# Patient Record
Sex: MALE | Race: WHITE | NOT HISPANIC OR LATINO | Employment: FULL TIME | URBAN - METROPOLITAN AREA
[De-identification: names, ages, dates, MRNs, and addresses within clinical notes are randomized per-mention and may not be internally consistent; named-entity substitution may affect disease eponyms.]

---

## 2017-07-26 ENCOUNTER — TELEPHONE (OUTPATIENT)
Dept: NEUROLOGY | Facility: CLINIC | Age: 32
End: 2017-07-26

## 2017-07-26 NOTE — TELEPHONE ENCOUNTER
Spoke with patient. Appointment scheduled with Dr. Matta. Pt verbalized an understanding and directions given.

## 2017-07-26 NOTE — TELEPHONE ENCOUNTER
----- Message from Erna Tripathi sent at 7/26/2017  2:22 PM CDT -----  Contact:  call/[phone]    Calling  To  Get  Fitted in   Schedule not  Available   //  Pt  Has  reff  And is  Suffering   With  Headaches // please call

## 2017-08-11 DIAGNOSIS — M25.512 LEFT SHOULDER PAIN, UNSPECIFIED CHRONICITY: Primary | ICD-10-CM

## 2017-08-15 ENCOUNTER — HOSPITAL ENCOUNTER (OUTPATIENT)
Dept: RADIOLOGY | Facility: HOSPITAL | Age: 32
Discharge: HOME | End: 2017-08-15
Attending: ORTHOPAEDIC SURGERY
Payer: COMMERCIAL

## 2017-08-15 ENCOUNTER — OFFICE VISIT (OUTPATIENT)
Dept: ORTHOPEDICS | Facility: CLINIC | Age: 32
End: 2017-08-15
Payer: COMMERCIAL

## 2017-08-15 VITALS
DIASTOLIC BLOOD PRESSURE: 91 MMHG | HEART RATE: 81 BPM | SYSTOLIC BLOOD PRESSURE: 139 MMHG | HEIGHT: 70 IN | BODY MASS INDEX: 26.05 KG/M2 | WEIGHT: 182 LBS

## 2017-08-15 DIAGNOSIS — M25.512 LEFT SHOULDER PAIN, UNSPECIFIED CHRONICITY: ICD-10-CM

## 2017-08-15 DIAGNOSIS — M75.22 BICEPS TENDONITIS, LEFT: Primary | ICD-10-CM

## 2017-08-15 DIAGNOSIS — M25.512 ACUTE PAIN OF LEFT SHOULDER: ICD-10-CM

## 2017-08-15 DIAGNOSIS — M25.612 STIFFNESS OF JOINT, SHOULDER REGION, LEFT: ICD-10-CM

## 2017-08-15 DIAGNOSIS — S43.432A SUPERIOR GLENOID LABRUM LESION OF LEFT SHOULDER, INITIAL ENCOUNTER: ICD-10-CM

## 2017-08-15 PROCEDURE — 73030 X-RAY EXAM OF SHOULDER: CPT | Mod: TC,PO,LT

## 2017-08-15 PROCEDURE — 3008F BODY MASS INDEX DOCD: CPT | Mod: S$GLB,,, | Performed by: ORTHOPAEDIC SURGERY

## 2017-08-15 PROCEDURE — 99999 PR PBB SHADOW E&M-EST. PATIENT-LVL III: CPT | Mod: PBBFAC,,, | Performed by: ORTHOPAEDIC SURGERY

## 2017-08-15 PROCEDURE — 99203 OFFICE O/P NEW LOW 30 MIN: CPT | Mod: S$GLB,,, | Performed by: ORTHOPAEDIC SURGERY

## 2017-08-15 PROCEDURE — 3075F SYST BP GE 130 - 139MM HG: CPT | Mod: S$GLB,,, | Performed by: ORTHOPAEDIC SURGERY

## 2017-08-15 PROCEDURE — 3080F DIAST BP >= 90 MM HG: CPT | Mod: S$GLB,,, | Performed by: ORTHOPAEDIC SURGERY

## 2017-08-15 PROCEDURE — 73030 X-RAY EXAM OF SHOULDER: CPT | Mod: 26,LT,, | Performed by: RADIOLOGY

## 2017-08-15 NOTE — PROGRESS NOTES
"Medical/DANNIELLE Student  Unsigned   Encounter Date: 8/15/2017  Rosa Armenta        Subjective:       Patient ID: Frank Becerra is a 31 y.o. male.     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 Localizes pain to "under left scapula" and reports it feels "like there is a marble under the scapula"   Seen by Dr Gill in 2014 and had MRI at that time. Has done 6w of PT, received 2-3 cortisone injections, Rx for ointments, TENS, NSAIDS and heat/cold on shoulder without relief. Reports the pain is relatively constant at ~5/10. Denies radiation. Describes pain as a pressure feeling, worse with increased use. Pain occasionally wakes him at night. Denies numbness, tingling, fever, chills. Reports weakness/general fatigue when using left arm above head. Reports some general muscle soreness in neck.      Review of Systems   Constitutional: Negative for chills, fever and unexpected weight change.   HENT: Negative.    Eyes: Negative.    Respiratory: Negative.    Cardiovascular: Negative.    Gastrointestinal: Negative.    Endocrine: Negative.    Genitourinary: Negative.    Musculoskeletal: Positive for arthralgias, myalgias and neck stiffness.   Skin: Negative.    Neurological: Positive for weakness. Negative for numbness.       Objective:      Physical Exam   Constitutional: He is oriented to person, place, and time. He appears well-developed and well-nourished.   HENT:   Head: Normocephalic and atraumatic.   Eyes: EOM are normal. Pupils are equal, round, and reactive to light.   Neck: Normal range of motion. Neck supple.   Cardiovascular: Normal rate and regular rhythm.    Pulmonary/Chest: Effort normal.   Abdominal: Soft.   Musculoskeletal: He exhibits tenderness.        Left shoulder: He exhibits decreased range of motion, tenderness and pain.   ~3-4cm scar noted to lateral aspect of left shoulder - " patient unsure of cause. Pain with palpation of the Left biceps tendon. Pain with palpation to trapezius along medial aspect of left scapula. Abnormal Apley Scratch test with decreased abduction, adduction, internal and external rotation of left shoulder. Pain in left shoulder with resistance during west-miguel test.     Neurological: He is alert and oriented to person, place, and time.   Skin: Skin is warm and dry.   Psychiatric: He has a normal mood and affect. Judgment normal.       Assessment:       1. Biceps tendonitis, left    2. Acute pain of left shoulder    3. Stiffness of joint, shoulder region, left    4. Superior glenoid labrum lesion of left shoulder, initial encounter        Plan:       # Biceps tendonitis, left    - Pt counseled on importance of increasing ROM and strength    - Ambulatory PT referral     - OTC NSAIDs for inflammation and pain     # Superior glenoid labrum lesion of left shoulder    - schedule MRI with arthrogram

## 2017-08-15 NOTE — PROGRESS NOTES
Subjective:          Chief Complaint: Frank Becerra is a 31 y.o. male who had concerns including Pain of the Left Shoulder.    HPI    ROS                Objective:        General: Frank is well-developed, well-nourished, appears stated age, in no acute distress, alert and oriented to time, place and person.     Ortho/SPM Exam            Assessment:       Encounter Diagnosis   Name Primary?    Acute pain of left shoulder Yes          Plan:

## 2017-08-15 NOTE — MEDICAL/APP STUDENT
"Subjective:       Patient ID: Frank Becerra is a 31 y.o. male.    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 Localizes pain to "under left scapula" and reports it feels "like there is a marble under the scapula"   Seen by Dr Gill in 2014 and had MRI at that time. Has done 6w of PT, received 2-3 cortisone injections, Rx for ointments, TENS, NSAIDS and heat/cold on shoulder without relief. Reports the pain is relatively constant at ~5/10. Denies radiation. Describes pain as a pressure feeling, worse with increased use. Pain occasionally wakes him at night. Denies numbness, tingling, fever, chills. Reports weakness/general fatigue when using left arm above head. Reports some general muscle soreness in neck.       Review of Systems   Constitutional: Negative for chills, fever and unexpected weight change.   HENT: Negative.    Eyes: Negative.    Respiratory: Negative.    Cardiovascular: Negative.    Gastrointestinal: Negative.    Endocrine: Negative.    Genitourinary: Negative.    Musculoskeletal: Positive for arthralgias, myalgias and neck stiffness.   Skin: Negative.    Neurological: Positive for weakness. Negative for numbness.       Objective:      Physical Exam   Constitutional: He is oriented to person, place, and time. He appears well-developed and well-nourished.   HENT:   Head: Normocephalic and atraumatic.   Eyes: EOM are normal. Pupils are equal, round, and reactive to light.   Neck: Normal range of motion. Neck supple.   Cardiovascular: Normal rate and regular rhythm.    Pulmonary/Chest: Effort normal.   Abdominal: Soft.   Musculoskeletal: He exhibits tenderness.        Left shoulder: He exhibits decreased range of motion, tenderness and pain.   ~3-4cm scar noted to lateral aspect of left shoulder - patient unsure of cause. Pain with palpation of the Left biceps tendon. Pain with " palpation to trapezius along medial aspect of left scapula. Abnormal Apley Scratch test with decreased abduction, adduction, internal and external rotation of left shoulder. Pain in left shoulder with resistance during west-miguel test.     Neurological: He is alert and oriented to person, place, and time.   Skin: Skin is warm and dry.   Psychiatric: He has a normal mood and affect. Judgment normal.       Assessment:       1. Biceps tendonitis, left    2. Acute pain of left shoulder    3. Stiffness of joint, shoulder region, left    4. Superior glenoid labrum lesion of left shoulder, initial encounter        Plan:       # Biceps tendonitis, left    - Pt counseled on importance of increasing ROM and strength    - Ambulatory PT referral     - OTC NSAIDs for inflammation and pain    # Superior glenoid labrum lesion of left shoulder    - schedule MRI with arthrogram      Patient discussed with and seen by Dr Michael Armenta, MS4

## 2017-08-16 ENCOUNTER — OFFICE VISIT (OUTPATIENT)
Dept: NEUROLOGY | Facility: CLINIC | Age: 32
End: 2017-08-16
Payer: COMMERCIAL

## 2017-08-16 VITALS
RESPIRATION RATE: 19 BRPM | BODY MASS INDEX: 26.05 KG/M2 | SYSTOLIC BLOOD PRESSURE: 136 MMHG | HEIGHT: 70 IN | DIASTOLIC BLOOD PRESSURE: 90 MMHG | WEIGHT: 182 LBS | HEART RATE: 88 BPM | TEMPERATURE: 98 F

## 2017-08-16 DIAGNOSIS — M25.612 STIFFNESS OF LEFT SHOULDER JOINT: ICD-10-CM

## 2017-08-16 DIAGNOSIS — M54.81 OCCIPITAL NEURALGIA OF LEFT SIDE: ICD-10-CM

## 2017-08-16 PROCEDURE — 3008F BODY MASS INDEX DOCD: CPT | Mod: S$GLB,,, | Performed by: PSYCHIATRY & NEUROLOGY

## 2017-08-16 PROCEDURE — 3075F SYST BP GE 130 - 139MM HG: CPT | Mod: S$GLB,,, | Performed by: PSYCHIATRY & NEUROLOGY

## 2017-08-16 PROCEDURE — 99203 OFFICE O/P NEW LOW 30 MIN: CPT | Mod: S$GLB,,, | Performed by: PSYCHIATRY & NEUROLOGY

## 2017-08-16 PROCEDURE — 99999 PR PBB SHADOW E&M-EST. PATIENT-LVL III: CPT | Mod: PBBFAC,,, | Performed by: PSYCHIATRY & NEUROLOGY

## 2017-08-16 PROCEDURE — 3080F DIAST BP >= 90 MM HG: CPT | Mod: S$GLB,,, | Performed by: PSYCHIATRY & NEUROLOGY

## 2017-08-16 NOTE — ASSESSMENT & PLAN NOTE
Currently resolved but had a 2-3 week episode of daily pain in the left occiput in the distribution of occipital nerve. Has predominantly left sided trapezius and cervical muscle spasm mild. I think this was most likely neuralgia triggered by increased muscle spasm. If this recurs can return to clinic for occipital nerve block.     Spasm is probably occurring in reaction to chronic left shoulder pain.    Probably has a trigger point in left shoulder referring pain up his neck

## 2017-08-16 NOTE — PROGRESS NOTES
Date of service: 8/16/2017  Referring provider: Jose Moulton    Subjective:      Chief complaint: Headache (pain in the back of the head)       Patient ID: Frank Becerra is a 31 y.o. gentleman with hyperlipidemia, hypertension presenting for evaluation of headache. He is a new patient to me.     History of Present Illness    Headache History:  Age at onset and course over time: pain in the left back of head for about 2-3 weeks in July 2017. Nothing in particular caused it. Gone now. Has a family history of ruptured aneurysm (maternal uncle and maternal grandmother) and stroke and was worried something was wrong. Had normal brain MRI and afterwards the pain gradually resolved. He also has chronic left shoulder pain (rotator cuff, biceps tendonitis) and had a spot that was palpated in his shoulder with referral up the neck.   Location: left occiput   Quality: constant pressure   Severity: mild   Duration: 2-3 weeks   Frequency: daily   Alleviated by: tylenol   Exacerbated by: none   Associated with: none   Sleep habits:  Caffeine intake:    Current acute treatment:  -- none     Current prevention:  -- none     Previously tried/failed acute treatment:  -- none     Previously tried/failed preventative treatment:  -- none       Review of patient's allergies indicates:  No Known Allergies  Current Outpatient Prescriptions   Medication Sig Dispense Refill    atorvastatin (LIPITOR) 40 MG tablet Take 1 tablet (40 mg total) by mouth every evening. 90 tablet 3    valsartan-hydrochlorothiazide (DIOVAN-HCT) 160-12.5 mg per tablet TAKE 1 TABLET BY MOUTH EVERY MORNING 90 tablet 1     No current facility-administered medications for this visit.        Past Medical History  Past Medical History:   Diagnosis Date    a Family H/O Cerebral Aneurysm     b Hypertension     c Hyperlipidemia With Mild Hypertriglyceridemia     d Family H/O DM     d Hyperglycemia     m Headaches 12/2014     q Left Leg Skin Lesions Due To  Tick Bites 10/4/16     q Tick Bites 10/4/16     10/4/16 RXd Doxy X 10 Days And Ordered Lyme Titers       Past Surgical History  Past Surgical History:   Procedure Laterality Date    HAND SURGERY Left        Family History  Family History   Problem Relation Age of Onset    Heart disease Mother     Stroke Mother        Social History  Social History     Social History    Marital status:      Spouse name: N/A    Number of children: N/A    Years of education: N/A     Occupational History    Not on file.     Social History Main Topics    Smoking status: Never Smoker    Smokeless tobacco: Never Used    Alcohol use Yes      Comment: ocassional     Drug use: No    Sexual activity: Not on file     Other Topics Concern    Not on file     Social History Narrative    No narrative on file        Review of Systems  Constitutional: no fever, no chills  Eyes: no change to vision, no redness, no tearing  Ears, nose, mouth, throat: no hearing loss, no tinnitus, no rhinorrhea, no difficulty swallowing   Cardiovascular: no palpitations  Respiratory: no shortness of breath  Gastrointestinal: no diarrhea, no constipation  Musculoskeletal: + left shoulder pain   Skin: no rashes  Neurologic: no numbness, weakness, change to speech, loss of coordination   Endocrine: no heat or cold intolerance     Objective:        Vitals:    08/16/17 0859   BP: (!) 136/90   Pulse: 88   Resp: 19   Temp: 98.3 °F (36.8 °C)     Body mass index is 26.11 kg/m².    Constitutional: appears in no acute distress, well-developed, well-nourished     Eyes: normal conjunctiva, PERRLA    Ears, nose, mouth, throat: external appearance of ears and nose normal, hearing intact to finger rub     Cardiovascular: regular rate and rhythm, no murmurs appreciated, no carotid bruits     Respiratory: unlabored respirations, breath sounds normal bilaterally    Gastrointestinal: no abdominal masses, no tenderness, no visible hernia    Musculoskeletal: normal tone  in all four extremities. No atrophy. No abnormal movements. Strength in all muscles groups of the upper and lower extremities is 5/5. Normal gait and station. Digits and nails normal.      Spine: cervical spine with normal ROM, + muscle spasm / mild tenderness in the left upper trapezius and left splenius capitus, no tenderness over ADAMA or AZAEL     Psychiatric: normal judgment and insight. Oriented to person, place, and time.     Neurologic:   Cortical functions: recent and remote memory intact, normal attention span and concentration, speech fluent, adequate fund of knowledge   Cranial nerves: visual fields full, PERRLA, EOMI, facial sensation intact in V1-V3, symmetric facial strength, hearing intact to finger rub, palate elevates symmetrically, shoulder shrug 5/5, tongue protrudes midline   Reflexes: 2+ in the upper and lower extremities  Sensation: intact to light touch and temperature   Coordination: normal finger to noise    Data Review:     Results for orders placed or performed in visit on 08/12/17   MRI brain without contrast    Narrative    MRI BRAIN WITHOUT CONTRAST    CPT: 96577    Clinical data: Headaches.    Technique: Multiplanar, multisequence noncontrast MRI of the brain was obtained.    Findings:    No evidence of mass effect or midline deviation is seen.  No evidence of hydrocephalus or abnormal extra-axial fluid collection is visualized.  The basilar cisterns are preserved.  The diffusion weighted imaging demonstrates no evidence of acute ischemia/infarction.  The gradient imaging demonstrates no evidence of suspicious hemosiderin deposition.  The sellar region appears to be within normal limits. Mild acoustical thickening in scattered bilateral ethmoid air cells is noted. The globes appear grossly intact.  The mastoid air cells appear to be grossly normal in signal intensity.    Impression    1. No acute intracranial abnormality is visualized.      Electronically signed by: ADELA HOWE MD  Date:      08/12/17  Time:    09:18      I personally reviewed this 8/12/17 study in the room with patient which I interpreted to be normal.       Lab Results   Component Value Date    CHOL 162 08/12/2017    HDL 40 08/12/2017    LDLCALC 108.0 08/12/2017    TRIG 70 08/12/2017       No results found for: WBC, HGB, HCT, MCV, PLT    No results found for: TSH    Assessment & Plan:       Problem List Items Addressed This Visit     Occipital neuralgia of left side    Current Assessment & Plan     Currently resolved but had a 2-3 week episode of daily pain in the left occiput in the distribution of occipital nerve. Has predominantly left sided trapezius and cervical muscle spasm mild. I think this was most likely neuralgia triggered by increased muscle spasm. If this recurs can return to clinic for occipital nerve block.     Spasm is probably occurring in reaction to chronic left shoulder pain.    Probably has a trigger point in left shoulder referring pain up his neck          Stiffness of left shoulder joint    Current Assessment & Plan     Probably the trigger for left sided shoulder region / neck muscle spasm leading to left occipital neuralgia. Patient declined muscle relaxer.           Other Visit Diagnoses    None.               Return if symptoms worsen or fail to improve.    Kyra Matta MD

## 2017-08-16 NOTE — ASSESSMENT & PLAN NOTE
Probably the trigger for left sided shoulder region / neck muscle spasm leading to left occipital neuralgia. Patient declined muscle relaxer.

## 2017-08-16 NOTE — LETTER
August 16, 2017      Jose Moulton, AUGUSTINA  [address]  [facility] [address]           [facility]  [address] [facility] [address]  Phone: [phone]  Fax: [phone]          Patient: Frank Becerra   MR Number: [id number]   YOB: 1985   Date of Visit: 8/16/2017       Dear Jose Moulton:    Thank you for referring Frank Becerra to me for evaluation. Attached you will find relevant portions of my assessment and plan of care.    If you have questions, please do not hesitate to call me. I look forward to following Frank Becerra along with you.    Sincerely,    Kyra Matta MD    Enclosure  CC:  No Recipients    If you would like to receive this communication electronically, please contact [email or web address] or [phone] to request more information on [employer] access.    For providers and/or their staff who would like to refer a patient to [facility], please contact us through our one-stop-shop provider referral line, Olya Alexandra, at [phone].    If you feel you have received this communication in error or would no longer like to receive these types of communications, please e-mail [email or web address]

## 2017-08-20 NOTE — PROGRESS NOTES
"Subjective:          Chief Complaint: Frank Becerra is a 31 y.o. male who had concerns including Pain of the Left Shoulder.    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 Localizes pain to "under left scapula" and reports it feels "like there is a marble under the scapula"   Seen by Dr Gill in 2014 and had MRI at that time. Has done 6w of PT, received 2-3 cortisone injections, Rx for ointments, TENS, NSAIDS and heat/cold on shoulder without relief. Reports the pain is relatively constant at ~5/10. Denies radiation. Describes pain as a pressure feeling, worse with increased use. Pain occasionally wakes him at night. Denies numbness, tingling, fever, chills. Reports weakness/general fatigue when using left arm above head. Reports some general muscle soreness in neck.          Pain   Associated symptoms include myalgias. Pertinent negatives include no chills or fever.       Review of Systems   Constitution: Negative for chills and fever.   Musculoskeletal: Positive for joint pain, muscle weakness, myalgias and stiffness.                   Objective:        General: Frank is well-developed, well-nourished, appears stated age, in no acute distress, alert and oriented to time, place and person.     General    Vitals reviewed.  Constitutional: He is oriented to person, place, and time. He appears well-developed and well-nourished. No distress.   HENT:   Nose: Nose normal.   Cardiovascular: Normal rate.    Pulmonary/Chest: Effort normal.   Neurological: He is oriented to person, place, and time.   Psychiatric: He has a normal mood and affect. His behavior is normal. Judgment and thought content normal.         Right Shoulder Exam   Right shoulder exam is normal.    Left Shoulder Exam     Inspection/Observation   Swelling: absent  Bruising: absent  Scars: present  Deformity: absent  Scapular Winging: absent  Scapular Dyskinesia: positive  Atrophy: " absent    Tenderness   The patient is tender to palpation of the supraspinatus, biceps tendon and medial scapula.    Range of Motion   Active Abduction: 160   Passive Abduction: 160   Forward Flexion: 160   Forward Elevation: 160  External Rotation 0 degrees:  40 abnormal   External Rotation 90 degrees: 60 abnormal  Internal Rotation 0 degrees:  Lumbar abnormal   Internal Rotation 90 degrees:  30 abnormal     Tests & Signs   Drop Arm: negative  Hawkin's test: positive  Impingement: positive  Rotator Cuff Painful Arc/Range: moderate  Active Compression Test ([employer]'s Sign): positive  Speed's Test: positive    Other   Sensation: normal       Muscle Strength   Left Upper Extremity  Shoulder Abduction: 5/5   Shoulder Internal Rotation: 5/5   Shoulder External Rotation: 5/5   Supraspinatus: 5/5/5   Subscapularis: 5/5/5   Biceps: 5/5/5     Vascular Exam       Left Pulses      Radial:                    2+      Capillary Refill  Left Hand: normal capillary refill      Current and previous radiographic studies and results were reviewed with the patient:   No fracture, subluxation, or other significant abnormality is identified.  Joints and soft tissues are unremarkable.   Impression      Normal left shoulder.           Assessment:       Encounter Diagnoses   Name Primary?    Acute pain of left shoulder     Biceps tendonitis, left Yes    Stiffness of joint, shoulder region, left     Superior glenoid labrum lesion of left shoulder, initial encounter           Plan:         # Biceps tendonitis, left    - Pt counseled on importance of increasing ROM and strength    - Ambulatory PT referral     - OTC NSAIDs for inflammation and pain     # Superior glenoid labrum lesion of left shoulder    - schedule MRI with arthrogram    F/U after MRI

## 2017-09-12 ENCOUNTER — HOSPITAL ENCOUNTER (OUTPATIENT)
Dept: RADIOLOGY | Facility: HOSPITAL | Age: 32
Discharge: HOME | End: 2017-09-12
Attending: ORTHOPAEDIC SURGERY
Payer: COMMERCIAL

## 2017-09-12 DIAGNOSIS — S43.432A SUPERIOR GLENOID LABRUM LESION OF LEFT SHOULDER, INITIAL ENCOUNTER: ICD-10-CM

## 2017-09-12 DIAGNOSIS — M25.612 STIFFNESS OF JOINT, SHOULDER REGION, LEFT: ICD-10-CM

## 2017-09-12 DIAGNOSIS — M25.512 ACUTE PAIN OF LEFT SHOULDER: ICD-10-CM

## 2017-09-12 DIAGNOSIS — M75.22 BICEPS TENDONITIS, LEFT: ICD-10-CM

## 2017-09-12 PROCEDURE — 25500020 PHARM REV CODE 255: Mod: PO | Performed by: ORTHOPAEDIC SURGERY

## 2017-09-12 PROCEDURE — 73222 MRI JOINT UPR EXTREM W/DYE: CPT | Mod: TC,PO,LT

## 2017-09-12 PROCEDURE — 73040 CONTRAST X-RAY OF SHOULDER: CPT | Mod: 26,LT,, | Performed by: RADIOLOGY

## 2017-09-12 PROCEDURE — 23350 INJECTION FOR SHOULDER X-RAY: CPT | Mod: LT,,, | Performed by: RADIOLOGY

## 2017-09-12 PROCEDURE — A9577 INJ MULTIHANCE: HCPCS | Mod: PO | Performed by: ORTHOPAEDIC SURGERY

## 2017-09-12 PROCEDURE — 23350 INJECTION FOR SHOULDER X-RAY: CPT | Mod: TC,PO

## 2017-09-12 PROCEDURE — 73222 MRI JOINT UPR EXTREM W/DYE: CPT | Mod: 26,LT,, | Performed by: RADIOLOGY

## 2017-09-12 RX ADMIN — GADOBENATE DIMEGLUMINE 1 ML: 529 INJECTION, SOLUTION INTRAVENOUS at 02:09

## 2017-09-12 RX ADMIN — IOHEXOL 5 ML: 240 INJECTION, SOLUTION INTRATHECAL; INTRAVASCULAR; INTRAVENOUS; ORAL at 01:09

## 2017-09-15 ENCOUNTER — TELEPHONE (OUTPATIENT)
Dept: ORTHOPEDICS | Facility: CLINIC | Age: 32
End: 2017-09-15

## 2017-09-15 NOTE — TELEPHONE ENCOUNTER
----- Message from Tawnya Shaikh sent at 9/14/2017  2:47 PM CDT -----  Please call patient in regards to MRI results , [phone] (home)

## 2017-09-26 ENCOUNTER — OFFICE VISIT (OUTPATIENT)
Dept: ORTHOPEDICS | Facility: CLINIC | Age: 32
End: 2017-09-26
Payer: COMMERCIAL

## 2017-09-26 VITALS
HEART RATE: 89 BPM | HEIGHT: 70 IN | DIASTOLIC BLOOD PRESSURE: 90 MMHG | SYSTOLIC BLOOD PRESSURE: 138 MMHG | BODY MASS INDEX: 26.05 KG/M2 | WEIGHT: 182 LBS

## 2017-09-26 DIAGNOSIS — M75.22 BICEPS TENDINITIS OF LEFT UPPER EXTREMITY: ICD-10-CM

## 2017-09-26 DIAGNOSIS — M25.612 STIFFNESS OF LEFT SHOULDER JOINT: ICD-10-CM

## 2017-09-26 DIAGNOSIS — M25.512 ACUTE PAIN OF LEFT SHOULDER: ICD-10-CM

## 2017-09-26 DIAGNOSIS — S43.432D SUPERIOR GLENOID LABRUM LESION OF LEFT SHOULDER, SUBSEQUENT ENCOUNTER: Primary | ICD-10-CM

## 2017-09-26 DIAGNOSIS — M75.22 BICEPS TENDONITIS, LEFT: ICD-10-CM

## 2017-09-26 PROCEDURE — 3008F BODY MASS INDEX DOCD: CPT | Mod: S$GLB,,, | Performed by: ORTHOPAEDIC SURGERY

## 2017-09-26 PROCEDURE — 99213 OFFICE O/P EST LOW 20 MIN: CPT | Mod: S$GLB,,, | Performed by: ORTHOPAEDIC SURGERY

## 2017-09-26 PROCEDURE — 3080F DIAST BP >= 90 MM HG: CPT | Mod: S$GLB,,, | Performed by: ORTHOPAEDIC SURGERY

## 2017-09-26 PROCEDURE — 3075F SYST BP GE 130 - 139MM HG: CPT | Mod: S$GLB,,, | Performed by: ORTHOPAEDIC SURGERY

## 2017-09-26 PROCEDURE — 99999 PR PBB SHADOW E&M-EST. PATIENT-LVL III: CPT | Mod: PBBFAC,,, | Performed by: ORTHOPAEDIC SURGERY

## 2017-09-26 NOTE — PROGRESS NOTES
"Subjective:          Chief Complaint: Frank Becerra is a 31 y.o. male who had concerns including Pain of the Left Shoulder.    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 Localizes pain to "under left scapula" and reports it feels "like there is a marble under the scapula"   Seen by Dr Gill in 2014 and had MRI at that time. Has done 6w of PT, received 2-3 cortisone injections, Rx for ointments, TENS, NSAIDS and heat/cold on shoulder without relief. Reports the pain is relatively constant at ~5/10. Denies radiation. Describes pain as a pressure feeling, worse with increased use. Pain occasionally wakes him at night. Denies numbness, tingling, fever, chills. Reports weakness/general fatigue when using left arm above head. Reports some general muscle soreness in neck.     Since his initial visit his ROM has improved with therapy however pain has continued. He is here for MRI results.         Pain   Associated symptoms include myalgias. Pertinent negatives include no chills or fever.       Review of Systems   Constitution: Negative for chills and fever.   Musculoskeletal: Positive for joint pain, muscle weakness, myalgias and stiffness.                   Objective:        General: Frank is well-developed, well-nourished, appears stated age, in no acute distress, alert and oriented to time, place and person.     General    Vitals reviewed.  Constitutional: He is oriented to person, place, and time. He appears well-developed and well-nourished. No distress.   HENT:   Nose: Nose normal.   Cardiovascular: Normal rate.    Pulmonary/Chest: Effort normal.   Neurological: He is oriented to person, place, and time.   Psychiatric: He has a normal mood and affect. His behavior is normal. Judgment and thought content normal.         Right Shoulder Exam   Right shoulder exam is normal.    Left Shoulder Exam     Inspection/Observation   Swelling: " absent  Bruising: absent  Scars: present  Deformity: absent  Scapular Winging: absent  Scapular Dyskinesia: positive  Atrophy: absent    Tenderness   The patient is tender to palpation of the supraspinatus, biceps tendon and medial scapula.    Range of Motion   Active Abduction: 170   Passive Abduction: 170   Forward Flexion: 180   Forward Elevation: 180  External Rotation 0 degrees:  40 abnormal   External Rotation 90 degrees: 70 abnormal  Internal Rotation 0 degrees:  Mid thoracic abnormal   Internal Rotation 90 degrees:  30 abnormal     Tests & Signs   Drop Arm: negative  Hawkin's test: positive  Impingement: positive  Rotator Cuff Painful Arc/Range: moderate  Active Compression test (Cuming's Sign): negative  Speed's Test: positive    Other   Sensation: normal       Muscle Strength   Left Upper Extremity  Shoulder Abduction: 5/5   Shoulder Internal Rotation: 5/5   Shoulder External Rotation: 5/5   Supraspinatus: 5/5/5   Subscapularis: 5/5/5   Biceps: 5/5/5     Vascular Exam       Left Pulses      Radial:                    2+      Capillary Refill  Left Hand: normal capillary refill      Current and previous radiographic studies and results were reviewed with the patient:   No fracture, subluxation, or other significant abnormality is identified.  Joints and soft tissues are unremarkable.   Impression      Normal left shoulder.     MRI:    Findings:    There is minimal degenerative change of the left acromioclavicular joint.  No widening of the left acromioclavicular joint to suggest an acromioclavicular joint injury..  No os acromiale.  There is a flat-type I acromion.  No subacromial/subdeltoid bursal fluid appreciated.    There is no evidence of high-grade partial or full-thickness rotator cuff tendon tear.  The long head of the biceps tendon is intact.  The subscapularis tendon is intact.  No significant high-grade cartilage loss within the shoulder joint.    The most significant abnormality relates to the  presence of a SLAP tear of the superior labrum which extends posterior to the biceps labral anchor (series 6 image 7-9 and series 7 image 5).  No definite para-labral cyst formation.  The visualized bony structures about the left shoulder show no evidence of acute fracture, contusion, or pathologic marrow replacement process.  There is minimal degenerative subcortical cyst formation within the greater tuberosity near the attachment of the infraspinatus tendon.    The musculature about the left shoulder joint shows no evidence of denervation edema, fatty atrophy, or intramuscular hematoma formation.   Impression         1.  SLAP tear of the superior labrum    2.  Minimal degenerative change of the left acromioclavicular joint    3.  No evidence of high-grade partial or full-thickness rotator cuff tendon tear.           Assessment:       Encounter Diagnoses   Name Primary?    Acute pain of left shoulder     Biceps tendonitis, left     Superior glenoid labrum lesion of left shoulder, subsequent encounter Yes    Biceps tendinitis of left upper extremity     Stiffness of left shoulder joint           Plan:       The patient was counseled today regarding his diagnostic study results and the different treatment options. I spent >50% of the time discussing conservative vs. Operative options with the patient as well as risks and benefits of the different options.    We reviewed with Frank today, the pathology and natural history of his diagnosis. We have discussed a variety of treatment options including medications, therapy and other alternative treatments. I also briefly explained the indications for surgery. After discussion, Frank decided to proceed with surgery. The decision was made to go forward with:    -  Left arthroscopic SLAP repair vs. Biceps tenodesis  - DOS: Patient will call us regarding surgery scheduling ([facility])  - Pre-Op Date: TBD  - Pre-Op Clearance Needed: NO  - Case Request Completed at this  visit    The details of the surgical procedure as well as risks and benefits will be explained when the patient returns for their pre-operative visit. At that time informed consent will be obtained and we will ensure the patient has taken care of all of their pre-operative responsibilities to include medical clearance if needed, PT/OT appointments, arranging home health if necessary and obtaining necessary DME. The patient will contact us if they have any questions or concerns in the interim.

## 2017-09-27 ENCOUNTER — TELEPHONE (OUTPATIENT)
Dept: ORTHOPEDICS | Facility: CLINIC | Age: 32
End: 2017-09-27

## 2017-09-27 NOTE — TELEPHONE ENCOUNTER
----- Message from Annamarie Alexis sent at 9/27/2017  1:13 PM CDT -----  Contact: Self  Patient left a voice mail message today at 12:30 to schedule his surgery.  Please call.

## 2017-09-29 DIAGNOSIS — M25.512 ACUTE PAIN OF LEFT SHOULDER: ICD-10-CM

## 2017-09-29 DIAGNOSIS — S43.432D SUPERIOR GLENOID LABRUM LESION OF LEFT SHOULDER, SUBSEQUENT ENCOUNTER: Primary | ICD-10-CM

## 2017-09-29 DIAGNOSIS — M75.22 BICEPS TENDINITIS OF LEFT UPPER EXTREMITY: ICD-10-CM

## 2017-09-29 PROBLEM — M25.612 STIFFNESS OF LEFT SHOULDER JOINT: Status: RESOLVED | Noted: 2017-08-15 | Resolved: 2017-09-29

## 2017-09-29 RX ORDER — MUPIROCIN 20 MG/G
1 OINTMENT TOPICAL
Status: CANCELLED | OUTPATIENT
Start: 2017-09-29

## 2017-10-04 ENCOUNTER — TELEPHONE (OUTPATIENT)
Dept: ORTHOPEDICS | Facility: CLINIC | Age: 32
End: 2017-10-04

## 2017-10-04 NOTE — TELEPHONE ENCOUNTER
Called and spoke with patient and advised patient he will need to drop off his paperwork to filled out to the office. He verbalized understanding.

## 2017-10-04 NOTE — TELEPHONE ENCOUNTER
----- Message from Annamarie Alexis sent at 10/4/2017  1:25 PM CDT -----  Contact: self  Patient left a voice mail message today at 10:00 for Marco.  Please call patient.

## 2017-10-11 ENCOUNTER — TELEPHONE (OUTPATIENT)
Dept: ORTHOPEDICS | Facility: CLINIC | Age: 32
End: 2017-10-11

## 2017-10-11 NOTE — TELEPHONE ENCOUNTER
----- Message from Taylor Peres sent at 10/11/2017  1:55 PM CDT -----  Patient is scheduled for surgery on 10/19/17 but he hurt his back this past weekend. He would like to talk to office concerning this. Please call for more details [phone].

## 2017-10-11 NOTE — TELEPHONE ENCOUNTER
Spoke to patient and he said he hurt his back packing furniture on 10/7 and woke up 10/8 could not move right leg and could not walk pt went to [facility] ER he states he was giving some injections in his back and he wants to know if it is necessary to be seen by Dr. Rodriguez before his surgery on 10/19

## 2017-10-17 ENCOUNTER — ANESTHESIA EVENT (OUTPATIENT)
Dept: SURGERY | Facility: HOSPITAL | Age: 32
End: 2017-10-17
Payer: COMMERCIAL

## 2017-10-19 ENCOUNTER — SURGERY (OUTPATIENT)
Age: 32
End: 2017-10-19

## 2017-10-19 ENCOUNTER — HOSPITAL ENCOUNTER (OUTPATIENT)
Facility: HOSPITAL | Age: 32
Discharge: HOME | End: 2017-10-19
Attending: ORTHOPAEDIC SURGERY | Admitting: ORTHOPAEDIC SURGERY
Payer: COMMERCIAL

## 2017-10-19 ENCOUNTER — ANESTHESIA (OUTPATIENT)
Dept: SURGERY | Facility: HOSPITAL | Age: 32
End: 2017-10-19
Payer: COMMERCIAL

## 2017-10-19 VITALS
HEIGHT: 69 IN | DIASTOLIC BLOOD PRESSURE: 67 MMHG | RESPIRATION RATE: 15 BRPM | TEMPERATURE: 98 F | SYSTOLIC BLOOD PRESSURE: 125 MMHG | HEART RATE: 98 BPM | OXYGEN SATURATION: 99 % | WEIGHT: 185 LBS | BODY MASS INDEX: 27.4 KG/M2

## 2017-10-19 DIAGNOSIS — S43.432D SUPERIOR GLENOID LABRUM LESION OF LEFT SHOULDER, SUBSEQUENT ENCOUNTER: Primary | ICD-10-CM

## 2017-10-19 DIAGNOSIS — M25.512 ACUTE PAIN OF LEFT SHOULDER: ICD-10-CM

## 2017-10-19 DIAGNOSIS — M75.22 BICEPS TENDINITIS OF LEFT UPPER EXTREMITY: ICD-10-CM

## 2017-10-19 PROCEDURE — 76942 ECHO GUIDE FOR BIOPSY: CPT | Mod: 26,,, | Performed by: ANESTHESIOLOGY

## 2017-10-19 PROCEDURE — 25000003 PHARM REV CODE 250: Mod: PO | Performed by: ORTHOPAEDIC SURGERY

## 2017-10-19 PROCEDURE — 64416 NJX AA&/STRD BRCH PL NFS IMG: CPT | Mod: 59,LT,, | Performed by: ANESTHESIOLOGY

## 2017-10-19 PROCEDURE — 63600175 PHARM REV CODE 636 W HCPCS: Mod: PO | Performed by: NURSE ANESTHETIST, CERTIFIED REGISTERED

## 2017-10-19 PROCEDURE — 29822 SHO ARTHRS SRG LMTD DBRDMT: CPT | Mod: 59,LT,, | Performed by: ORTHOPAEDIC SURGERY

## 2017-10-19 PROCEDURE — 27200664 HC NERVE BLOCK COMPLETE KIT: Mod: PO | Performed by: ANESTHESIOLOGY

## 2017-10-19 PROCEDURE — D9220A PRA ANESTHESIA: Mod: ANES,,, | Performed by: ANESTHESIOLOGY

## 2017-10-19 PROCEDURE — 36000710: Mod: PO | Performed by: ORTHOPAEDIC SURGERY

## 2017-10-19 PROCEDURE — 37000009 HC ANESTHESIA EA ADD 15 MINS: Mod: PO | Performed by: ORTHOPAEDIC SURGERY

## 2017-10-19 PROCEDURE — 29807 SHO ARTHRS SRG RPR SLAP LES: CPT | Mod: LT,,, | Performed by: ORTHOPAEDIC SURGERY

## 2017-10-19 PROCEDURE — 71000039 HC RECOVERY, EACH ADD'L HOUR: Mod: PO | Performed by: ORTHOPAEDIC SURGERY

## 2017-10-19 PROCEDURE — C1713 ANCHOR/SCREW BN/BN,TIS/BN: HCPCS | Mod: PO | Performed by: ORTHOPAEDIC SURGERY

## 2017-10-19 PROCEDURE — 71000033 HC RECOVERY, INTIAL HOUR: Mod: PO | Performed by: ORTHOPAEDIC SURGERY

## 2017-10-19 PROCEDURE — 63600175 PHARM REV CODE 636 W HCPCS: Mod: PO | Performed by: ORTHOPAEDIC SURGERY

## 2017-10-19 PROCEDURE — 25000003 PHARM REV CODE 250: Mod: PO | Performed by: ANESTHESIOLOGY

## 2017-10-19 PROCEDURE — 27201423 OPTIME MED/SURG SUP & DEVICES STERILE SUPPLY: Mod: PO | Performed by: ORTHOPAEDIC SURGERY

## 2017-10-19 PROCEDURE — 76942 ECHO GUIDE FOR BIOPSY: CPT | Mod: PO | Performed by: ANESTHESIOLOGY

## 2017-10-19 PROCEDURE — D9220A PRA ANESTHESIA: Mod: CRNA,,, | Performed by: NURSE ANESTHETIST, CERTIFIED REGISTERED

## 2017-10-19 PROCEDURE — 37000008 HC ANESTHESIA 1ST 15 MINUTES: Mod: PO | Performed by: ORTHOPAEDIC SURGERY

## 2017-10-19 PROCEDURE — 25000003 PHARM REV CODE 250: Mod: PO | Performed by: NURSE ANESTHETIST, CERTIFIED REGISTERED

## 2017-10-19 PROCEDURE — 36000711: Mod: PO | Performed by: ORTHOPAEDIC SURGERY

## 2017-10-19 PROCEDURE — 63600175 PHARM REV CODE 636 W HCPCS: Mod: PO | Performed by: ANESTHESIOLOGY

## 2017-10-19 DEVICE — ANCHOR SUTURE YKNOT FLX 1.3MM: Type: IMPLANTABLE DEVICE | Site: SHOULDER | Status: FUNCTIONAL

## 2017-10-19 RX ORDER — FENTANYL CITRATE 50 UG/ML
50 INJECTION, SOLUTION INTRAMUSCULAR; INTRAVENOUS ONCE
Status: COMPLETED | OUTPATIENT
Start: 2017-10-19 | End: 2017-10-19

## 2017-10-19 RX ORDER — HYDROMORPHONE HYDROCHLORIDE 2 MG/ML
1 INJECTION, SOLUTION INTRAMUSCULAR; INTRAVENOUS; SUBCUTANEOUS EVERY 4 HOURS PRN
Status: DISCONTINUED | OUTPATIENT
Start: 2017-10-19 | End: 2017-10-19 | Stop reason: HOSPADM

## 2017-10-19 RX ORDER — PROMETHAZINE HYDROCHLORIDE 25 MG/1
25 TABLET ORAL EVERY 6 HOURS PRN
Qty: 20 TABLET | Refills: 0 | Status: SHIPPED | OUTPATIENT
Start: 2017-10-19 | End: 2019-01-10

## 2017-10-19 RX ORDER — LIDOCAINE HCL/PF 100 MG/5ML
SYRINGE (ML) INTRAVENOUS
Status: DISCONTINUED | OUTPATIENT
Start: 2017-10-19 | End: 2017-10-19

## 2017-10-19 RX ORDER — OXYCODONE HYDROCHLORIDE 5 MG/1
5 TABLET ORAL
Status: DISCONTINUED | OUTPATIENT
Start: 2017-10-19 | End: 2017-10-19 | Stop reason: HOSPADM

## 2017-10-19 RX ORDER — DOCUSATE SODIUM 100 MG/1
100 CAPSULE, LIQUID FILLED ORAL 2 TIMES DAILY
Qty: 60 CAPSULE | Refills: 0 | Status: SHIPPED | OUTPATIENT
Start: 2017-10-19 | End: 2019-01-10

## 2017-10-19 RX ORDER — MIDAZOLAM HYDROCHLORIDE 1 MG/ML
INJECTION, SOLUTION INTRAMUSCULAR; INTRAVENOUS
Status: DISCONTINUED | OUTPATIENT
Start: 2017-10-19 | End: 2017-10-19

## 2017-10-19 RX ORDER — EPINEPHRINE 1 MG/ML
INJECTION, SOLUTION INTRACARDIAC; INTRAMUSCULAR; INTRAVENOUS; SUBCUTANEOUS
Status: DISCONTINUED | OUTPATIENT
Start: 2017-10-19 | End: 2017-10-19 | Stop reason: HOSPADM

## 2017-10-19 RX ORDER — ONDANSETRON 2 MG/ML
4 INJECTION INTRAMUSCULAR; INTRAVENOUS EVERY 12 HOURS PRN
Status: DISCONTINUED | OUTPATIENT
Start: 2017-10-19 | End: 2017-10-19 | Stop reason: HOSPADM

## 2017-10-19 RX ORDER — MIDAZOLAM HYDROCHLORIDE 5 MG/ML
1 INJECTION INTRAMUSCULAR; INTRAVENOUS EVERY 5 MIN PRN
Status: DISCONTINUED | OUTPATIENT
Start: 2017-10-19 | End: 2017-10-19 | Stop reason: HOSPADM

## 2017-10-19 RX ORDER — MUPIROCIN 20 MG/G
1 OINTMENT TOPICAL
Status: COMPLETED | OUTPATIENT
Start: 2017-10-19 | End: 2017-10-19

## 2017-10-19 RX ORDER — MIDAZOLAM HYDROCHLORIDE 5 MG/ML
2 INJECTION INTRAMUSCULAR; INTRAVENOUS ONCE
Status: COMPLETED | OUTPATIENT
Start: 2017-10-19 | End: 2017-10-19

## 2017-10-19 RX ORDER — FENTANYL CITRATE 50 UG/ML
INJECTION, SOLUTION INTRAMUSCULAR; INTRAVENOUS
Status: DISCONTINUED | OUTPATIENT
Start: 2017-10-19 | End: 2017-10-19

## 2017-10-19 RX ORDER — FENTANYL CITRATE 50 UG/ML
25 INJECTION, SOLUTION INTRAMUSCULAR; INTRAVENOUS EVERY 5 MIN PRN
Status: DISCONTINUED | OUTPATIENT
Start: 2017-10-19 | End: 2017-10-19 | Stop reason: HOSPADM

## 2017-10-19 RX ORDER — DEXAMETHASONE SODIUM PHOSPHATE 4 MG/ML
8 INJECTION, SOLUTION INTRA-ARTICULAR; INTRALESIONAL; INTRAMUSCULAR; INTRAVENOUS; SOFT TISSUE
Status: COMPLETED | OUTPATIENT
Start: 2017-10-19 | End: 2017-10-19

## 2017-10-19 RX ORDER — SODIUM CHLORIDE, SODIUM LACTATE, POTASSIUM CHLORIDE, CALCIUM CHLORIDE 600; 310; 30; 20 MG/100ML; MG/100ML; MG/100ML; MG/100ML
INJECTION, SOLUTION INTRAVENOUS CONTINUOUS
Status: DISCONTINUED | OUTPATIENT
Start: 2017-10-19 | End: 2017-10-19 | Stop reason: HOSPADM

## 2017-10-19 RX ORDER — OXYCODONE AND ACETAMINOPHEN 5; 325 MG/1; MG/1
1 TABLET ORAL EVERY 8 HOURS PRN
Qty: 90 TABLET | Refills: 0 | Status: SHIPPED | OUTPATIENT
Start: 2017-10-19 | End: 2019-01-10

## 2017-10-19 RX ORDER — HYDROCODONE BITARTRATE AND ACETAMINOPHEN 5; 325 MG/1; MG/1
1 TABLET ORAL EVERY 4 HOURS PRN
Status: DISCONTINUED | OUTPATIENT
Start: 2017-10-19 | End: 2017-10-19 | Stop reason: HOSPADM

## 2017-10-19 RX ORDER — FENTANYL CITRATE 50 UG/ML
50 INJECTION, SOLUTION INTRAMUSCULAR; INTRAVENOUS EVERY 5 MIN PRN
Status: DISCONTINUED | OUTPATIENT
Start: 2017-10-19 | End: 2017-10-19 | Stop reason: HOSPADM

## 2017-10-19 RX ORDER — PROPOFOL 10 MG/ML
VIAL (ML) INTRAVENOUS
Status: DISCONTINUED | OUTPATIENT
Start: 2017-10-19 | End: 2017-10-19

## 2017-10-19 RX ORDER — ROCURONIUM BROMIDE 10 MG/ML
INJECTION, SOLUTION INTRAVENOUS
Status: DISCONTINUED | OUTPATIENT
Start: 2017-10-19 | End: 2017-10-19

## 2017-10-19 RX ORDER — ONDANSETRON 2 MG/ML
INJECTION INTRAMUSCULAR; INTRAVENOUS
Status: DISCONTINUED | OUTPATIENT
Start: 2017-10-19 | End: 2017-10-19

## 2017-10-19 RX ORDER — LIDOCAINE HYDROCHLORIDE 10 MG/ML
1 INJECTION, SOLUTION EPIDURAL; INFILTRATION; INTRACAUDAL; PERINEURAL ONCE
Status: DISCONTINUED | OUTPATIENT
Start: 2017-10-19 | End: 2017-10-19 | Stop reason: HOSPADM

## 2017-10-19 RX ADMIN — EPINEPHRINE 3 ML: 1 INJECTION, SOLUTION INTRAMUSCULAR; SUBCUTANEOUS at 07:10

## 2017-10-19 RX ADMIN — DEXAMETHASONE SODIUM PHOSPHATE 8 MG: 4 INJECTION, SOLUTION INTRAMUSCULAR; INTRAVENOUS at 06:10

## 2017-10-19 RX ADMIN — ONDANSETRON 4 MG: 2 INJECTION, SOLUTION INTRAMUSCULAR; INTRAVENOUS at 07:10

## 2017-10-19 RX ADMIN — PROPOFOL 200 MG: 10 INJECTION, EMULSION INTRAVENOUS at 07:10

## 2017-10-19 RX ADMIN — ROCURONIUM BROMIDE 50 MG: 10 INJECTION, SOLUTION INTRAVENOUS at 07:10

## 2017-10-19 RX ADMIN — MIDAZOLAM HYDROCHLORIDE 2 MG: 5 INJECTION, SOLUTION INTRAMUSCULAR; INTRAVENOUS at 06:10

## 2017-10-19 RX ADMIN — PROMETHAZINE HYDROCHLORIDE 12.5 MG: 25 INJECTION INTRAMUSCULAR; INTRAVENOUS at 10:10

## 2017-10-19 RX ADMIN — FENTANYL CITRATE 50 MCG: 50 INJECTION, SOLUTION INTRAMUSCULAR; INTRAVENOUS at 07:10

## 2017-10-19 RX ADMIN — EPINEPHRINE 3 ML: 1 INJECTION, SOLUTION INTRAMUSCULAR; SUBCUTANEOUS at 08:10

## 2017-10-19 RX ADMIN — FENTANYL CITRATE 50 MCG: 50 INJECTION, SOLUTION INTRAMUSCULAR; INTRAVENOUS at 06:10

## 2017-10-19 RX ADMIN — SODIUM CHLORIDE, SODIUM LACTATE, POTASSIUM CHLORIDE, AND CALCIUM CHLORIDE: .6; .31; .03; .02 INJECTION, SOLUTION INTRAVENOUS at 08:10

## 2017-10-19 RX ADMIN — DEXTROSE 2 G: 50 INJECTION, SOLUTION INTRAVENOUS at 07:10

## 2017-10-19 RX ADMIN — MUPIROCIN 1 G: 20 OINTMENT TOPICAL at 06:10

## 2017-10-19 RX ADMIN — EPINEPHRINE 1 ML: 1 INJECTION, SOLUTION INTRAMUSCULAR; SUBCUTANEOUS at 07:10

## 2017-10-19 RX ADMIN — SODIUM CHLORIDE, SODIUM LACTATE, POTASSIUM CHLORIDE, AND CALCIUM CHLORIDE: .6; .31; .03; .02 INJECTION, SOLUTION INTRAVENOUS at 06:10

## 2017-10-19 RX ADMIN — LIDOCAINE HYDROCHLORIDE 100 MG: 20 INJECTION PARENTERAL at 07:10

## 2017-10-19 RX ADMIN — MIDAZOLAM HYDROCHLORIDE 1 MG: 1 INJECTION, SOLUTION INTRAMUSCULAR; INTRAVENOUS at 06:10

## 2017-10-19 NOTE — H&P (VIEW-ONLY)
"Subjective:          Chief Complaint: Frank Becerra is a 31 y.o. male who had concerns including Pain of the Left Shoulder.    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 Localizes pain to "under left scapula" and reports it feels "like there is a marble under the scapula"   Seen by Dr Gill in 2014 and had MRI at that time. Has done 6w of PT, received 2-3 cortisone injections, Rx for ointments, TENS, NSAIDS and heat/cold on shoulder without relief. Reports the pain is relatively constant at ~5/10. Denies radiation. Describes pain as a pressure feeling, worse with increased use. Pain occasionally wakes him at night. Denies numbness, tingling, fever, chills. Reports weakness/general fatigue when using left arm above head. Reports some general muscle soreness in neck.     Since his initial visit his ROM has improved with therapy however pain has continued. He is here for MRI results.         Pain   Associated symptoms include myalgias. Pertinent negatives include no chills or fever.       Review of Systems   Constitution: Negative for chills and fever.   Musculoskeletal: Positive for joint pain, muscle weakness, myalgias and stiffness.                   Objective:        General: Frank is well-developed, well-nourished, appears stated age, in no acute distress, alert and oriented to time, place and person.     General    Vitals reviewed.  Constitutional: He is oriented to person, place, and time. He appears well-developed and well-nourished. No distress.   HENT:   Nose: Nose normal.   Cardiovascular: Normal rate.    Pulmonary/Chest: Effort normal.   Neurological: He is oriented to person, place, and time.   Psychiatric: He has a normal mood and affect. His behavior is normal. Judgment and thought content normal.         Right Shoulder Exam   Right shoulder exam is normal.    Left Shoulder Exam     Inspection/Observation   Swelling: " absent  Bruising: absent  Scars: present  Deformity: absent  Scapular Winging: absent  Scapular Dyskinesia: positive  Atrophy: absent    Tenderness   The patient is tender to palpation of the supraspinatus, biceps tendon and medial scapula.    Range of Motion   Active Abduction: 170   Passive Abduction: 170   Forward Flexion: 180   Forward Elevation: 180  External Rotation 0 degrees:  40 abnormal   External Rotation 90 degrees: 70 abnormal  Internal Rotation 0 degrees:  Mid thoracic abnormal   Internal Rotation 90 degrees:  30 abnormal     Tests & Signs   Drop Arm: negative  Hawkin's test: positive  Impingement: positive  Rotator Cuff Painful Arc/Range: moderate  Active Compression test (Kossuth's Sign): negative  Speed's Test: positive    Other   Sensation: normal       Muscle Strength   Left Upper Extremity  Shoulder Abduction: 5/5   Shoulder Internal Rotation: 5/5   Shoulder External Rotation: 5/5   Supraspinatus: 5/5/5   Subscapularis: 5/5/5   Biceps: 5/5/5     Vascular Exam       Left Pulses      Radial:                    2+      Capillary Refill  Left Hand: normal capillary refill      Current and previous radiographic studies and results were reviewed with the patient:   No fracture, subluxation, or other significant abnormality is identified.  Joints and soft tissues are unremarkable.   Impression      Normal left shoulder.     MRI:    Findings:    There is minimal degenerative change of the left acromioclavicular joint.  No widening of the left acromioclavicular joint to suggest an acromioclavicular joint injury..  No os acromiale.  There is a flat-type I acromion.  No subacromial/subdeltoid bursal fluid appreciated.    There is no evidence of high-grade partial or full-thickness rotator cuff tendon tear.  The long head of the biceps tendon is intact.  The subscapularis tendon is intact.  No significant high-grade cartilage loss within the shoulder joint.    The most significant abnormality relates to the  presence of a SLAP tear of the superior labrum which extends posterior to the biceps labral anchor (series 6 image 7-9 and series 7 image 5).  No definite para-labral cyst formation.  The visualized bony structures about the left shoulder show no evidence of acute fracture, contusion, or pathologic marrow replacement process.  There is minimal degenerative subcortical cyst formation within the greater tuberosity near the attachment of the infraspinatus tendon.    The musculature about the left shoulder joint shows no evidence of denervation edema, fatty atrophy, or intramuscular hematoma formation.   Impression         1.  SLAP tear of the superior labrum    2.  Minimal degenerative change of the left acromioclavicular joint    3.  No evidence of high-grade partial or full-thickness rotator cuff tendon tear.           Assessment:       Encounter Diagnoses   Name Primary?    Acute pain of left shoulder     Biceps tendonitis, left     Superior glenoid labrum lesion of left shoulder, subsequent encounter Yes    Biceps tendinitis of left upper extremity     Stiffness of left shoulder joint           Plan:       The patient was counseled today regarding his diagnostic study results and the different treatment options. I spent >50% of the time discussing conservative vs. Operative options with the patient as well as risks and benefits of the different options.    We reviewed with Frank today, the pathology and natural history of his diagnosis. We have discussed a variety of treatment options including medications, therapy and other alternative treatments. I also briefly explained the indications for surgery. After discussion, Frank decided to proceed with surgery. The decision was made to go forward with:    -  Left arthroscopic SLAP repair vs. Biceps tenodesis      The details of the surgical procedure as well as risks and benefits will be explained when the patient returns for their pre-operative visit. At that  time informed consent will be obtained and we will ensure the patient has taken care of all of their pre-operative responsibilities to include medical clearance if needed, PT/OT appointments, arranging home health if necessary and obtaining necessary DME. The patient will contact us if they have any questions or concerns in the interim.

## 2017-10-19 NOTE — PLAN OF CARE
SPOKE WITH PT'S WIFE PER TELEPHONE.  PT STABLE AND C/O FREE.  WILL WAIT FOR HER TO GIVE FULL POST OP INSTRUCTIONS.  YAZAN MOONEY RN HELPING TO DRESS THE PT.

## 2017-10-19 NOTE — ANESTHESIA POSTPROCEDURE EVALUATION
"Anesthesia Post Evaluation    Patient: Frank Becerra    Procedure(s) Performed: Procedure(s) (LRB):  ARTHROSCOPY SHOULDER WITH SLAP REPAIR (Left)    Final Anesthesia Type: general  Patient location during evaluation: med/surg floor  Patient participation: Yes- Able to Participate  Level of consciousness: awake and alert and oriented  Post-procedure vital signs: reviewed and stable  Pain management: adequate  Airway patency: patent  PONV status at discharge: No PONV  Anesthetic complications: no      Cardiovascular status: blood pressure returned to baseline  Respiratory status: unassisted, spontaneous ventilation and room air  Hydration status: euvolemic  Follow-up not needed.        Visit Vitals  /73   Pulse 91   Temp 36.8 °C (98.2 °F) (Skin)   Resp 16   Ht 5' 9" (1.753 m)   Wt 83.9 kg (185 lb)   SpO2 99%   BMI 27.32 kg/m²       Pain/Rob Score: Pain Assessment Performed: Yes (10/19/2017 10:03 AM)  Presence of Pain: non-verbal indicators present (10/19/2017 10:03 AM)  Pain Rating Prior to Med Admin: 0 (10/19/2017  6:49 AM)  Rob Score: 3 (10/19/2017 10:03 AM)      "

## 2017-10-19 NOTE — TRANSFER OF CARE
"Anesthesia Transfer of Care Note    Patient: Frank Becerra    Procedure(s) Performed: Procedure(s) (LRB):  ARTHROSCOPY SHOULDER WITH SLAP REPAIR (Left)    Patient location: PACU    Anesthesia Type: general    Transport from OR: Transported from OR on room air with adequate spontaneous ventilation    Post pain: adequate analgesia    Post assessment: no apparent anesthetic complications    Post vital signs: stable    Level of consciousness: responds to stimulation    Nausea/Vomiting: no nausea/vomiting    Complications: none    Transfer of care protocol was followed      Last vitals:   Visit Vitals  BP (!) 135/90 (BP Location: Right arm, Patient Position: Lying)   Pulse 91   Temp 36.8 °C (98.2 °F) (Skin)   Resp 16   Ht 5' 9" (1.753 m)   Wt 83.9 kg (185 lb)   SpO2 99%   BMI 27.32 kg/m²     "

## 2017-10-19 NOTE — INTERVAL H&P NOTE
The patient has been examined and the H&P has been reviewed:    I concur with the findings and changes have been noted since the H&P was written:  The patient did injure his back while moving some things last week. He was seen in the ER and given steroids.  The patient was seen this morning. The operative extremity (left shoulder) was identified by the patient and initialed by an operating surgeon. There have been no changes in the patient's health since their last office visit. The patient's questions were all answered and we will proceed to the O.R.      Anesthesia/Surgery risks, benefits and alternative options discussed and understood by patient/family.          Active Hospital Problems    Diagnosis  POA    Superior glenoid labrum lesion of left shoulder [S43.432G]  Yes      Resolved Hospital Problems    Diagnosis Date Resolved POA   No resolved problems to display.

## 2017-10-19 NOTE — BRIEF OP NOTE
[facility]  Brief Operative Note     SUMMARY     Surgery Date: 10/19/2017     Surgeon(s) and Role:     * Ranjit Rodriguez MD - Primary    Assistant: VINITA Davalos    Pre-op Diagnosis:  Superior glenoid labrum lesion of left shoulder, subsequent encounter [S43.432D]  Acute pain of left shoulder [M25.512]  Biceps tendinitis of left upper extremity [M75.22]    Post-op Diagnosis:  Superior glenoid labrum lesion of left shoulder, subsequent encounter [S43.432D]  Acute pain of left shoulder [M25.512]  Biceps tendinitis of left upper extremity [M75.22]    Procedure(s) (LRB):  ARTHROSCOPY SHOULDER WITH SLAP REPAIR (Left)    Anesthesia: General and left interscalene with catheter    Description of the findings of the procedure: Type II SLAP tear    Findings/Key Components: see operative note      Estimated Blood Loss: minimal         Specimens:   Specimen (12h ago through future)    None          Discharge Note    SUMMARY     Admit Date: 10/19/2017    Discharge Date and Time: No discharge date for patient encounter.    Attending Physician: Ranjit Rodriguez MD     Discharge Provider: Ranjit Rodriguez    Final Diagnosis :Superior glenoid labrum lesion of left shoulder, subsequent encounter [S43.432D]  Acute pain of left shoulder [M25.512]  Biceps tendinitis of left upper extremity [M75.22]    Outcome of Hospitalization, Treatment, Procedure, or Surgery:  Patient was admitted for an outpatient procedure and tolerated it well without complications.    Disposition: Home or Self care    Follow Up/Patient Instructions: The patient will be discharged home after meeting all discharge criteria. The patient will resume a diet as tolerated. Please follow post-operative instructions for activity restrictions. Keep previously planned post-operative follow -up appointment.      Medications:  Reconciled Home Medications:   Current Discharge Medication List      START taking these medications    Details   docusate  sodium (COLACE) 100 MG capsule Take 1 capsule (100 mg total) by mouth 2 (two) times daily.  Qty: 60 capsule, Refills: 0      oxycodone-acetaminophen (PERCOCET) 5-325 mg per tablet Take 1 tablet by mouth every 8 (eight) hours as needed for Pain.  Qty: 90 tablet, Refills: 0      promethazine (PHENERGAN) 25 MG tablet Take 1 tablet (25 mg total) by mouth every 6 (six) hours as needed for Nausea.  Qty: 20 tablet, Refills: 0         CONTINUE these medications which have NOT CHANGED    Details   atorvastatin (LIPITOR) 40 MG tablet Take 1 tablet (40 mg total) by mouth every evening.  Qty: 90 tablet, Refills: 3      flaxseed oil 1,000 mg Cap Take 2,000 mg by mouth once daily.      hydrocodone-acetaminophen 5-325mg (NORCO) 5-325 mg per tablet Take 1 tablet by mouth every 4 (four) hours as needed for Pain.  Qty: 12 tablet, Refills: 0      methocarbamol (ROBAXIN) 750 MG Tab Take 1 tablet (750 mg total) by mouth 3 (three) times daily as needed.  Qty: 60 tablet, Refills: 3    Associated Diagnoses: Acute right-sided low back pain with right-sided sciatica; Psoas muscle strain, right, initial encounter; Muscle spasm of back      tramadol (ULTRAM) 50 mg tablet Take 1 tablet (50 mg total) by mouth every 6 (six) hours as needed for Pain.  Qty: 30 tablet, Refills: 1    Associated Diagnoses: Acute right-sided low back pain with right-sided sciatica; Psoas muscle strain, right, initial encounter; Muscle spasm of back      valsartan-hydrochlorothiazide (DIOVAN-HCT) 160-12.5 mg per tablet Take 1 tablet by mouth every morning.  Qty: 90 tablet, Refills: 3    Comments: **Patient requests 90 days supply**      gabapentin (NEURONTIN) 600 MG tablet Take 1 tablet (600 mg total) by mouth 3 (three) times daily.  Qty: 90 tablet, Refills: 11    Associated Diagnoses: Acute right-sided low back pain with right-sided sciatica; Muscle spasm of back; Lumbar radiculopathy             Discharge Procedure Orders  Diet general     Ice to affected area     No  driving, operating heavy equipment or signing legal documents while taking pain medication     Call MD for:  temperature >100.4     Call MD for:  persistent nausea and vomiting     Call MD for:  severe uncontrolled pain     Call MD for:  difficulty breathing, headache or visual disturbances     Call MD for:  redness, tenderness, or signs of infection (pain, swelling, redness, odor or green/yellow discharge around incision site)     Call MD for:  hives     Call MD for:  persistent dizziness or light-headedness     Call MD for:  extreme fatigue     Keep surgical extremity elevated     Wound care routine (specify)   Order Comments: Wound care routine: see post-op instructions       Follow-up Information     Ranjit Rodriguez MD.    Specialties:  Sports Medicine, Orthopedic Surgery  Why:  patient has f/u appointments scheduled  Contact information:  [address] [facility] [place] [address] [phone]

## 2017-10-19 NOTE — DISCHARGE INSTRUCTIONS
Discharge Instructions: After Your Surgery  Youve just had surgery. During surgery, you were given medicine called anesthesia to keep you relaxed and free of pain. After surgery, you may have some pain or nausea. This is common. Here are some tips for feeling better and getting well after surgery.     Stay on schedule with your medicine.   Going home  Your healthcare provider will show you how to take care of yourself when you go home. He or she will also answer your questions. Have an adult family member or friend drive you home. For the first 24 hours after your surgery:  · Do not drive or use heavy equipment.  · Do not make important decisions or sign legal papers.  · Do not drink alcohol.  · Have someone stay with you, if needed. He or she can watch for problems and help keep you safe.  Be sure to go to all follow-up visits with your healthcare provider. And rest after your surgery for as long as your healthcare provider tells you to.  Coping with pain  If you have pain after surgery, pain medicine will help you feel better. Take it as told, before pain becomes severe. Also, ask your healthcare provider or pharmacist about other ways to control pain. This might be with heat, ice, or relaxation. And follow any other instructions your surgeon or nurse gives you.  Tips for taking pain medicine  To get the best relief possible, remember these points:  · Pain medicines can upset your stomach. Taking them with a little food may help.  · Most pain relievers taken by mouth need at least 20 to 30 minutes to start to work.  · Taking medicine on a schedule can help you remember to take it. Try to time your medicine so that you can take it before starting an activity. This might be before you get dressed, go for a walk, or sit down for dinner.  · Constipation is a common side effect of pain medicines. Call your healthcare provider before taking any medicines such as laxatives or stool softeners to help ease constipation.  Also ask if you should skip any foods. Drinking lots of fluids and eating foods such as fruits and vegetables that are high in fiber can also help. Remember, do not take laxatives unless your surgeon has prescribed them.  · Drinking alcohol and taking pain medicine can cause dizziness and slow your breathing. It can even be deadly. Do not drink alcohol while taking pain medicine.  · Pain medicine can make you react more slowly to things. Do not drive or run machinery while taking pain medicine.  Your healthcare provider may tell you to take acetaminophen to help ease your pain. Ask him or her how much you are supposed to take each day. Acetaminophen or other pain relievers may interact with your prescription medicines or other over-the-counter (OTC) medicines. Some prescription medicines have acetaminophen and other ingredients. Using both prescription and OTC acetaminophen for pain can cause you to overdose. Read the labels on your OTC medicines with care. This will help you to clearly know the list of ingredients, how much to take, and any warnings. It may also help you not take too much acetaminophen. If you have questions or do not understand the information, ask your pharmacist or healthcare provider to explain it to you before you take the OTC medicine.  Managing nausea  Some people have an upset stomach after surgery. This is often because of anesthesia, pain, or pain medicine, or the stress of surgery. These tips will help you handle nausea and eat healthy foods as you get better. If you were on a special food plan before surgery, ask your healthcare provider if you should follow it while you get better. These tips may help:  · Do not push yourself to eat. Your body will tell you when to eat and how much.  · Start off with clear liquids and soup. They are easier to digest.  · Next try semi-solid foods, such as mashed potatoes, applesauce, and gelatin, as you feel ready.  · Slowly move to solid foods. Dont  eat fatty, rich, or spicy foods at first.  · Do not force yourself to have 3 large meals a day. Instead eat smaller amounts more often.  · Take pain medicines with a small amount of solid food, such as crackers or toast, to avoid nausea.     Call your surgeon if  · You still have pain an hour after taking medicine. The medicine may not be strong enough.  · You feel too sleepy, dizzy, or groggy. The medicine may be too strong.  · You have side effects like nausea, vomiting, or skin changes, such as rash, itching, or hives.       If you have obstructive sleep apnea  You were given anesthesia medicine during surgery to keep you comfortable and free of pain. After surgery, you may have more apnea spells because of this medicine and other medicines you were given. The spells may last longer than usual.   At home:  · Keep using the continuous positive airway pressure (CPAP) device when you sleep. Unless your healthcare provider tells you not to, use it when you sleep, day or night. CPAP is a common device used to treat obstructive sleep apnea.  · Talk with your provider before taking any pain medicine, muscle relaxants, or sedatives. Your provider will tell you about the possible dangers of taking these medicines.  Date Last Reviewed: 12/1/2016 © 2000-2017 [employer]. [address] [place] [address], [place] [address]. All rights reserved. This information is not intended as a substitute for professional medical care. Always follow your healthcare professional's instructions.           [facility] [address], [place]                                    [phone]             Postoperative Instructions for Shoulder Surgery               Your Surgery Included:   Open              Arthroscopic [] Instability Repair     [x] Diagnostic   [] Rotator Cuff Repair     [] Lysis of Adhesions / Manipulation [] Distal Clavicle Resection    [x] Debridement [] Biceps Tenodesis       [x] Labrum  [x] Rotator  Cuff   [] Cartilage   [] Contracture Release    [x] SLAP Repair   [] Fracture Fixation    [] Instability Repair  [] AC Joint Reconstruction      [] Rotator Cuff Repair [] Joint Replacement     [] Subacromial Decompression / Bursectomy   [] Hemiarthroplasty  [] Total Shoulder    [] Biceps Tenotomy / Tenodesis    [] Reverse Total Shoulder       [] Distal Clavicle Resection          [] Amniox Arthrocentesis    [] Contracture Release                 Call our office ([phone]) immediately if you experience any of the following:      Excessive bleeding or pus like drainage at the incision site      Uncontrollable pain not relieved by pain medication      Excessive swelling or redness at the incision site      Fever above 101.5 degrees not controlled with Tylenol or Motrin      Shortness of Breath      Any foul odor or blistering from the surgery site   FOR EMERGENCIES: If any unusual problems or difficulties occur, call our office at [phone], or page the  at [phone] who will direct your call appropriately   1.   Pain Management: A cold therapy cuff, pain medications, local injections, and in some cases, regional anesthesia injections are used to manage your post-operative pain. The decision to use each of these options is based on their risks and benefits.    Medications: You were given one or more of the following medication prescriptions before leaving the hospital. Have the prescriptions filled at a pharmacy on your way home and follow the instructions on the bottles. If you need a refill, please call your pharmacy.     Narcotic Medication (usually Vicodin ES, Lortab, Percocet or Nucynta): Begin taking the medication before your shoulder starts to hurt. Some patients do not like to take any medication, but if you wait until your pain is severe before taking, you will be very uncomfortable for several hours waiting for the narcotic to work. Always take with food.    Nausea / Vomiting: For  this issue, we prescribe Phenergan, use this medication as directed.    Cold Therapy: You may have been sent home with a [employer]® cold therapy unit and wrap for your shoulder. Fill with ice and water to the indicated fill line and use throughout the day for the first two days and then as needed to help relieve pain and control swelling.     Regional Anesthesia Injections (Blocks): You may have been given a regional nerve block either before or after surgery. This may make your entire shoulder numb for 24-36 hours.                    2.   Diet: Eat a bland diet for the first day after surgery. Progress your diet as tolerated. Constipation may occur with Narcotic usage, contact our office if you are experiencing constipation.   3.   Activity: After you arrive at home, spend most of the first 24 hours resting in bed, on the couch, or in a reclining chair. After the first 24 hours at home, slowly increase your activity level based on your symptoms.   4.   Dressing Change: Remove the dressing on the 3rd day (or your therapist may remove it on the day of eval). It is normal for some blood to be seen on the dressings. It is also normal for you to see apparent bruising on the skin around your shoulder when you remove the dressing. If present, leave the steri-strip tape across the incisions. If you are concerned by the drainage or the appearance of your shoulder, please call one of the numbers listed below.   5.   Showering: You may shower on the 3rd day after surgery with waterproof bandages over small incisions. If you have an incision with Prineo (clear mesh), it does not need to be covered. Do not submerge in any water until after your postoperative appointment in clinic.   6.   Shoulder Sling (with/without Pillow attachment): You may have been sent home with a sling / pillow attachment holding your arm away from your body. You may remove the sling when changing clothes or bathing. Make sure to wear the sling while  sleeping unless instructed otherwise. You may remove at rest or for exercises.           [x] You need to wear the sling without pillow for 24 hours a day for 4 weeks.   7.  Shoulder Exercises: Begin these exercises the first day after surgery in order to help you regain your motion and strength. You may do the following marked exercises for 2-5 mins five times a day:   [x] Shoulder shrugs - Shrug your shoulders up and down.            [x] Scapular retractions - (Squeeze shoulder blades together): Squeeze shoulder blades together while slightly pulling them down (do not shrug your shoulders upward); You can perform 10-15 reps, several times throughout the day, when seated at your desk, driving in the car, etc.                                                                                                                        [x] Elbow motion - Straighten and bend your elbow.                                                                                                               [x] Ball squeezes - use ball attached to sling/pillow or soft (nerf) ball for  strengthening                                                                                                                 8.  Physical Therapy: Physical therapy is an essential component to your recovery from surgery. Your physical therapy will start in 1 days.    **FOLLOW THE SLAP REPAIR PROTOCOL**   FIRST POSTOPERATIVE VISIT: As scheduled.

## 2017-10-19 NOTE — OP NOTE
DATE OF PROCEDURE: 10/19/2017     PREOPERATIVE DIAGNOSES:   1. Left shoulder SLAP tear     POSTOPERATIVE DIAGNOSES:   1. Left shoulder SLAP tear  2. Rotator cuff fraying    PROCEDURES:   1. Left shoulder arthroscopic SLAP repair  2. Left shoulder arthroscopic rotator cuff debridement    SURGEON: Ranjit Rodriguez M.D.     ASSISTANT: VINITA Davalos    COMPLICATIONS: None.     POSITION: Lateral decubitus.     ANESTHESIA: General endotracheal plus left upper extremity interscalene block.     EBL: Minimal    IVF: 1200cc    EXAMINATION UNDER ANESTHESIA: Left shoulder full range of motion, grade 1+ anterior drawer, 1+ posterior drawer, 1+ sulcus sign, which reduced to 0 in external rotation.     ARTHROSCOPIC FINDINGS:   1. Type II Superior labral (SLAP) tear from 10 o'clock to 2 o'clock.   2. Intact capsule   3. Intact inferior labrum.   4. Intra-articular fraying of posterior rotator cuff.   5. Intact rotator interval.   6. Intact biceps tendon with minimal tendinopathy    IMPLANT USED:   1. Linvatec 1.3mm single loaded Y-knot all suture anchor x 4      INDICATIONS: The patient is a 31-year-old male , who injured his  left shoulder and has failed non-operative care. MRI with contrast shows an anterior-inferior labral tear. After failing nonoperative treatment, the risks and benefits were discussed and he elected to proceed with operative intervention.     PROCEDURE IN DETAIL: The patient brought into the room. After undergoing general endotracheal anesthesia and a left upper extremity interscalene block, male was placed on a well-padded operating table. A beanbag positioner was used. his left upper extremity was prepped and draped in the usual sterile fashion and then the patient was placed in a lateral decubitus position with the left side up. A sterile arm peterson was used during the case. No more than 12 pounds of traction was used during the critical portions of the case. A sterile bump was used during the case.      The standard posterior portal and mid-anterior portals were created. Diagnostic arthroscopy performed. The glenohumeral joint was entered. There was no significant glenoid or humeral head chondromalacia.     There was a SLAP tear at the biceps anchor extending from the 1 o'clock position posteriorly to the 10 o'clock position anteriorly. The biceps tendon was brought into the joint and found to be intact. The anterior and posterior labrum was intact and stable to probe. The rotator cuff had some fraying posteriorly which was debrided.    DEBRIDEMENT:  The rotator cuff was examined and found to have a low grade fraying at the undersurface of the supraspinatus/infraspinatus junction.  This was judged to be amenable to debridement.  Using an oscillating shaver, the tendon was debrided back to healthy tissue.  The final tear was judged to be less than 50% of the tendon thickness.    SLAP REPAIR: A screw in canula was placed in the anterior portal.  The superior glenoid was abraded down to bony healing to allow for better re-attachment of the superior labrum.  The labral tissue was amenable to repair with 4 anchors with a horizontal mattress suture configuration for 3 anchors and the most posterior anchor was done with a simple suture configuration.  Each anchor was placed via the cannula with a drill guide, followed by the drill and anchor. The sutures were then docked in an accessory trans-cuff portal medial to the rotator cuff cable just off the lateral acromion. The sutures were then passed through the labrum via a suture shuttle device through our anterior cannula for the 10, 11, 12 o'clock anchors. The anchor and sutures were passed via the posterior cannula for the 1 o'clock anchor.  The knots were tied on the back side of the labrum with each anchor. All the suture were cut short and the labrum was judged to be stable to probe.  Additional anchors were not needed.    After our reconstruction was complete,  traction was taken down and our shoulder had improved stability and the humeral head with well balanced on the glenoid.     Portal sites were closed with 3-0 Nylon,  Xeroform, 4 x 4, fluffs, ABD pads and tape. The patient was placed in a neutral sling and pillow for protection, was extubated in the room, transferred to Recovery Room in stable condition accompanied by his physician. There were no complications in the case.     I was prepped and scrubbed and did perform all critical portions of case.    Postop plan for the patient is to follow the SLAP repair protocol.    If there are any questions regarding this procedure please feel free to contact Dr. Rodriguez.

## 2017-10-19 NOTE — PLAN OF CARE
PT TO PACU S/P LT SHOULDER SCOPE ET AL.  PRESENTED WITH ORAL AIRWAY AND CHIN LIFT NEC PER CRNA.  SEE PROGRESSION PER EPIC DETAILS ON F/S.  LT SHOULDER INTERSCALENE BLOCK INTACT.  ON Q BALL CONNECTED PER ORDER.  PT DENIES C/O PAIN.  DISCHARGE INSTRUCTIONS WILL BE TO PT ONLY AS HIS WIFE IS NOT AT THE SURG CENTER

## 2017-10-19 NOTE — ANESTHESIA PREPROCEDURE EVALUATION
10/19/2017  Frank Becerra is a 31 y.o., male.    Anesthesia Evaluation    I have reviewed the Patient Summary Reports.    I have reviewed the Nursing Notes.   I have reviewed the Medications.     Review of Systems  Anesthesia Hx:  Awareness during hand sx in his teens   Social:  Non-Smoker    Cardiovascular:   Exercise tolerance: good Hypertension Denies CAD.    Denies CABG/stent.   Denies Angina. hyperlipidemia    Pulmonary:  Pulmonary Normal    Hepatic/GI:  Hepatic/GI Normal    Neurological:   Headaches        Physical Exam  General:  Well nourished    Airway/Jaw/Neck:  Airway Findings: Mouth Opening: Normal Mallampati: III  Improves to II with phonation.  TM Distance: Normal, at least 6 cm  Jaw/Neck Findings:  Neck ROM: Normal ROM       Chest/Lungs:  Chest/Lungs Findings: Clear to auscultation, Normal Respiratory Rate     Heart/Vascular:  Heart Findings: Rate: Normal  Rhythm: Regular Rhythm        Mental Status:  Mental Status Findings:  Cooperative, Alert and Oriented         Anesthesia Plan  Type of Anesthesia, risks & benefits discussed:  Anesthesia Type:  general  Patient's Preference:   Intra-op Monitoring Plan: standard ASA monitors  Intra-op Monitoring Plan Comments:   Post Op Pain Control Plan: peripheral nerve block  Post Op Pain Control Plan Comments:   Induction:   IV  Beta Blocker:  Patient is not currently on a Beta-Blocker (No further documentation required).       Informed Consent: Patient understands risks and agrees with Anesthesia plan.  Questions answered. Anesthesia consent signed with patient.  ASA Score: 2     Day of Surgery Review of History & Physical: I have interviewed and examined the patient. I have reviewed the patient's H&P dated:  There are no significant changes.          Ready For Surgery From Anesthesia Perspective.

## 2017-10-19 NOTE — ANESTHESIA PROCEDURE NOTES
Peripheral    Patient location during procedure: pre-op   Block not for primary anesthetic.  Reason for block: at surgeon's request and post-op pain management   Post-op Pain Location: left slap repair  Start time: 10/19/2017 6:45 AM  Timeout: 10/19/2017 6:43 AM   End time: 10/19/2017 6:53 AM  Surgery related to: left slap repair  Staffing  Anesthesiologist: KURT BIRD  Other anesthesia staff: JOSE COYNE  Performed: anesthesiologist   Preanesthetic Checklist  Completed: patient identified, site marked, surgical consent, pre-op evaluation, timeout performed, IV checked, risks and benefits discussed and monitors and equipment checked  Peripheral Block  Patient position: supine  Prep: ChloraPrep and site prepped and draped  Patient monitoring: heart rate, cardiac monitor, continuous pulse ox, continuous capnometry and frequent blood pressure checks  Block type: interscalene  Laterality: left  Injection technique: continuous  Needle  Needle type: Tuohy   Needle gauge: 18 G  Needle length: 2 in  Needle localization: anatomical landmarks and ultrasound guidance  Needle insertion depth: 5 cm  Catheter type: non-stimulating  Catheter size: 20 G  Catheter at skin depth: 5 cm  Test dose: lidocaine 1.5% with Epi 1-to-200,000 and negative   -ultrasound image captured on disc.  Assessment  Injection assessment: negative aspiration, negative parasthesia and local visualized surrounding nerve  Paresthesia pain: none  Heart rate change: no  Slow fractionated injection: yes  Medications:  Bolus administered: 20 mL of 0.25 bupivacaine  Epinephrine added: none  Additional Notes  VSS.  [facility] RN monitoring vitals throughout procedure.  Patient tolerated procedure well.

## 2017-10-19 NOTE — H&P
"Subjective:          Chief Complaint: Frank Becerra is a 31 y.o. male who had concerns including Pain of the Left Shoulder.    Patient is Left hand dominant and works as a Nurse [employer]   C/o pain in the left shoulder x multiple years. Localizes pain to "under left scapula" and reports it feels "like there is a marble under the scapula"   Seen by Dr Gill in 2014 and had MRI at that time. Has done 6w of PT, received 2-3 cortisone injections, Rx for ointments, TENS, NSAIDS and heat/cold on shoulder without relief. Reports the pain is relatively constant at ~5/10. Denies radiation. Describes pain as a pressure feeling, worse with increased use. Pain occasionally wakes him at night. Denies numbness, tingling, fever, chills. Reports weakness/general fatigue when using left arm above head. Reports some general muscle soreness in neck.     Since his initial visit his ROM has improved with therapy however pain has continued. He is here for MRI results.         Pain   Associated symptoms include myalgias. Pertinent negatives include no chills or fever.       Review of Systems   Constitution: Negative for chills and fever.   Musculoskeletal: Positive for joint pain, muscle weakness, myalgias and stiffness.                   Objective:        General: Frank is well-developed, well-nourished, appears stated age, in no acute distress, alert and oriented to time, place and person.     General    Vitals reviewed.  Constitutional: He is oriented to person, place, and time. He appears well-developed and well-nourished. No distress.   HENT:   Nose: Nose normal.   Cardiovascular: Normal rate.    Pulmonary/Chest: Effort normal.   Neurological: He is oriented to person, place, and time.   Psychiatric: He has a normal mood and affect. His behavior is normal. Judgment and thought content normal.         Right Shoulder Exam   Right shoulder exam is normal.    Left Shoulder Exam     Inspection/Observation   Swelling: " absent  Bruising: absent  Scars: present  Deformity: absent  Scapular Winging: absent  Scapular Dyskinesia: positive  Atrophy: absent    Tenderness   The patient is tender to palpation of the supraspinatus, biceps tendon and medial scapula.    Range of Motion   Active Abduction: 170   Passive Abduction: 170   Forward Flexion: 180   Forward Elevation: 180  External Rotation 0 degrees:  40 abnormal   External Rotation 90 degrees: 70 abnormal  Internal Rotation 0 degrees:  Mid thoracic abnormal   Internal Rotation 90 degrees:  30 abnormal     Tests & Signs   Drop Arm: negative  Hawkin's test: positive  Impingement: positive  Rotator Cuff Painful Arc/Range: moderate  Active Compression test ([place]'s Sign): negative  Speed's Test: positive    Other   Sensation: normal       Muscle Strength   Left Upper Extremity  Shoulder Abduction: 5/5   Shoulder Internal Rotation: 5/5   Shoulder External Rotation: 5/5   Supraspinatus: 5/5/5   Subscapularis: 5/5/5   Biceps: 5/5/5     Vascular Exam       Left Pulses      Radial:                    2+      Capillary Refill  Left Hand: normal capillary refill      Current and previous radiographic studies and results were reviewed with the patient:   No fracture, subluxation, or other significant abnormality is identified.  Joints and soft tissues are unremarkable.   Impression      Normal left shoulder.     MRI:    Findings:    There is minimal degenerative change of the left acromioclavicular joint.  No widening of the left acromioclavicular joint to suggest an acromioclavicular joint injury..  No os acromiale.  There is a flat-type I acromion.  No subacromial/subdeltoid bursal fluid appreciated.    There is no evidence of high-grade partial or full-thickness rotator cuff tendon tear.  The long head of the biceps tendon is intact.  The subscapularis tendon is intact.  No significant high-grade cartilage loss within the shoulder joint.    The most significant abnormality relates to the  presence of a SLAP tear of the superior labrum which extends posterior to the biceps labral anchor (series 6 image 7-9 and series 7 image 5).  No definite para-labral cyst formation.  The visualized bony structures about the left shoulder show no evidence of acute fracture, contusion, or pathologic marrow replacement process.  There is minimal degenerative subcortical cyst formation within the greater tuberosity near the attachment of the infraspinatus tendon.    The musculature about the left shoulder joint shows no evidence of denervation edema, fatty atrophy, or intramuscular hematoma formation.   Impression         1.  SLAP tear of the superior labrum    2.  Minimal degenerative change of the left acromioclavicular joint    3.  No evidence of high-grade partial or full-thickness rotator cuff tendon tear.           Assessment:       Encounter Diagnoses   Name Primary?    Acute pain of left shoulder     Biceps tendonitis, left     Superior glenoid labrum lesion of left shoulder, subsequent encounter Yes    Biceps tendinitis of left upper extremity     Stiffness of left shoulder joint           Plan:       The patient was counseled today regarding his diagnostic study results and the different treatment options. I spent >50% of the time discussing conservative vs. Operative options with the patient as well as risks and benefits of the different options.    We reviewed with Frank today, the pathology and natural history of his diagnosis. We have discussed a variety of treatment options including medications, therapy and other alternative treatments. I also briefly explained the indications for surgery. After discussion, Frank decided to proceed with surgery. The decision was made to go forward with:    -  Left arthroscopic SLAP repair vs. Biceps tenodesis      The details of the surgical procedure as well as risks and benefits will be explained when the patient returns for their pre-operative visit. At that  time informed consent will be obtained and we will ensure the patient has taken care of all of their pre-operative responsibilities to include medical clearance if needed, PT/OT appointments, arranging home health if necessary and obtaining necessary DME. The patient will contact us if they have any questions or concerns in the interim.

## 2017-10-19 NOTE — PLAN OF CARE
Pts BP elevated. PT states he did not take BP meds this am. Anesthesia notified, all questions answered. Denies recent fever or illness. Pt states ready for procedure.

## 2017-10-20 NOTE — ANESTHESIA POST-OP PAIN MANAGEMENT
Acute Pain Service Progress Note    Frank Becerra is a 31 y.o., male, [id number].    Surgery: : ARTHROSCOPY SHOULDER WITH SLAP REPAIR (Left )     Post Op Day #: 1    Catheter type: perineural  interscalene    Infusion type: Ropivacaine 0.2%  8 basal    APS unable to reach pt by phone.  LVM.      Evaluator Rashaad Medrano

## 2017-10-30 DIAGNOSIS — M25.512 ACUTE PAIN OF LEFT SHOULDER: Primary | ICD-10-CM

## 2017-10-31 ENCOUNTER — OFFICE VISIT (OUTPATIENT)
Dept: ORTHOPEDICS | Facility: CLINIC | Age: 32
End: 2017-10-31
Payer: COMMERCIAL

## 2017-10-31 ENCOUNTER — HOSPITAL ENCOUNTER (OUTPATIENT)
Dept: RADIOLOGY | Facility: HOSPITAL | Age: 32
Discharge: HOME | End: 2017-10-31
Attending: ORTHOPAEDIC SURGERY
Payer: COMMERCIAL

## 2017-10-31 VITALS
DIASTOLIC BLOOD PRESSURE: 81 MMHG | BODY MASS INDEX: 27.11 KG/M2 | HEIGHT: 69 IN | SYSTOLIC BLOOD PRESSURE: 127 MMHG | WEIGHT: 183 LBS | HEART RATE: 84 BPM

## 2017-10-31 DIAGNOSIS — Z98.890 HISTORY OF ARTHROSCOPY OF LEFT SHOULDER: Primary | ICD-10-CM

## 2017-10-31 DIAGNOSIS — M25.512 ACUTE PAIN OF LEFT SHOULDER: ICD-10-CM

## 2017-10-31 DIAGNOSIS — G89.18 POST-OP PAIN: ICD-10-CM

## 2017-10-31 PROCEDURE — 99024 POSTOP FOLLOW-UP VISIT: CPT | Mod: S$GLB,,, | Performed by: ORTHOPAEDIC SURGERY

## 2017-10-31 PROCEDURE — 73030 X-RAY EXAM OF SHOULDER: CPT | Mod: TC,PO,LT

## 2017-10-31 PROCEDURE — 99999 PR PBB SHADOW E&M-EST. PATIENT-LVL III: CPT | Mod: PBBFAC,,, | Performed by: ORTHOPAEDIC SURGERY

## 2017-10-31 PROCEDURE — 73030 X-RAY EXAM OF SHOULDER: CPT | Mod: 26,LT,, | Performed by: RADIOLOGY

## 2017-10-31 NOTE — PROGRESS NOTES
Subjective:          Chief Complaint: Frank Becerra is a 31 y.o. male who had concerns including Pain of the Left Shoulder.    Mr. Becerra is here for f/u after his shoulder surgery. He is attending therapy.  Pain: 2/10    DATE OF PROCEDURE: 10/19/2017      PREOPERATIVE DIAGNOSES:   1. Left shoulder SLAP tear      POSTOPERATIVE DIAGNOSES:   1. Left shoulder SLAP tear  2. Rotator cuff fraying     PROCEDURES:   1. Left shoulder arthroscopic SLAP repair  2. Left shoulder arthroscopic rotator cuff debridement     SURGEON: Ranjit Rodriguez M.D.         Past Medical History:   Diagnosis Date    a Family H/O Cerebral Aneurysm     b Hypertension     c Hyperlipidemia With Mild Hypertriglyceridemia     d Family H/O DM     d Hyperglycemia     General anesthetics causing adverse effect in therapeutic use     pt. states he has come out of anesthesia prematurely    l Left Shoulder Arthropathy     m Headaches 12/2014     m Left Occipital Neuralgia Due To Left Shoulder Arthropathy     Dr. Matta ([facility])    q Left Leg Skin Lesions Due To Tick Bites 10/4/16     q Tick Bites 10/4/16     10/4/16 RXd Doxy X 10 Days And Ordered Lyme Titers    Wellness Visit 8/16/2017        Past Surgical History:   Procedure Laterality Date    HAND SURGERY Left     WISDOM TOOTH EXTRACTION  2007       Family History   Problem Relation Age of Onset    Heart disease Mother     Stroke Mother          Current Outpatient Prescriptions:     atorvastatin (LIPITOR) 40 MG tablet, Take 1 tablet (40 mg total) by mouth every evening., Disp: 90 tablet, Rfl: 3    docusate sodium (COLACE) 100 MG capsule, Take 1 capsule (100 mg total) by mouth 2 (two) times daily., Disp: 60 capsule, Rfl: 0    flaxseed oil 1,000 mg Cap, Take 2,000 mg by mouth once daily., Disp: , Rfl:     gabapentin (NEURONTIN) 600 MG tablet, Take 1 tablet (600 mg total) by mouth 3 (three) times daily., Disp: 90 tablet, Rfl: 11    hydrocodone-acetaminophen 5-325mg  (NORCO) 5-325 mg per tablet, Take 1 tablet by mouth every 4 (four) hours as needed for Pain., Disp: 12 tablet, Rfl: 0    methocarbamol (ROBAXIN) 750 MG Tab, Take 1 tablet (750 mg total) by mouth 3 (three) times daily as needed., Disp: 60 tablet, Rfl: 3    oxycodone-acetaminophen (PERCOCET) 5-325 mg per tablet, Take 1 tablet by mouth every 8 (eight) hours as needed for Pain., Disp: 90 tablet, Rfl: 0    promethazine (PHENERGAN) 25 MG tablet, Take 1 tablet (25 mg total) by mouth every 6 (six) hours as needed for Nausea., Disp: 20 tablet, Rfl: 0    tramadol (ULTRAM) 50 mg tablet, Take 1 tablet (50 mg total) by mouth every 6 (six) hours as needed for Pain., Disp: 30 tablet, Rfl: 1    valsartan-hydrochlorothiazide (DIOVAN-HCT) 160-12.5 mg per tablet, Take 1 tablet by mouth every morning., Disp: 90 tablet, Rfl: 3    Review of patient's allergies indicates:  No Known Allergies    Vitals:    10/31/17 0907   BP: 127/81   Pulse: 84         Review of Systems   Musculoskeletal: Positive for joint pain, muscle weakness and stiffness.   All other systems reviewed and are negative.                  Objective:        General: Frank is well-developed, well-nourished, appears stated age, in no acute distress, alert and oriented to time, place and person.     General    Vitals reviewed.  Constitutional: He is oriented to person, place, and time. He appears well-developed and well-nourished. No distress.   HENT:   Head: Normocephalic and atraumatic.   Nose: Nose normal.   Cardiovascular: Normal rate.    Pulmonary/Chest: Effort normal.   Neurological: He is alert and oriented to person, place, and time.   Psychiatric: He has a normal mood and affect. His behavior is normal. Judgment and thought content normal.             Left Shoulder Exam     Inspection/Observation   Swelling: absent  Bruising: absent  Scars: present  Deformity: absent  Scapular Winging: absent  Scapular Dyskinesia: negative  Atrophy: absent    Tenderness   Left  shoulder tenderness location: portals.    Range of Motion   Passive Abduction: 90   External Rotation 0 degrees:  30 (passive) abnormal     Other   Sensation: normal     Comments:  Incisions healing; sutures removed      Vascular Exam       Left Pulses      Radial:                    2+      Capillary Refill  Left Hand: normal capillary refill      Current and previous radiographic studies and results were reviewed with the patient:   No obvious changes from previous films        Assessment:       Encounter Diagnoses   Name Primary?    History of arthroscopy of left shoulder Yes    Post-op pain           Plan:         Continue with sling  Continue with PT  Continue with no work   Work not completed  F/U in 2 weeks or sooner if needed

## 2017-11-14 ENCOUNTER — OFFICE VISIT (OUTPATIENT)
Dept: ORTHOPEDICS | Facility: CLINIC | Age: 32
End: 2017-11-14
Payer: COMMERCIAL

## 2017-11-14 VITALS
BODY MASS INDEX: 27.11 KG/M2 | DIASTOLIC BLOOD PRESSURE: 83 MMHG | WEIGHT: 183 LBS | HEIGHT: 69 IN | SYSTOLIC BLOOD PRESSURE: 127 MMHG | HEART RATE: 114 BPM

## 2017-11-14 DIAGNOSIS — G89.18 POST-OP PAIN: Primary | ICD-10-CM

## 2017-11-14 DIAGNOSIS — Z98.890 S/P ARTHROSCOPY OF SHOULDER: ICD-10-CM

## 2017-11-14 PROCEDURE — 99999 PR PBB SHADOW E&M-EST. PATIENT-LVL III: CPT | Mod: PBBFAC,,, | Performed by: ORTHOPAEDIC SURGERY

## 2017-11-14 PROCEDURE — 99024 POSTOP FOLLOW-UP VISIT: CPT | Mod: S$GLB,,, | Performed by: ORTHOPAEDIC SURGERY

## 2017-11-21 PROBLEM — M25.512 ACUTE PAIN OF LEFT SHOULDER: Status: RESOLVED | Noted: 2017-08-15 | Resolved: 2017-11-21

## 2017-11-21 PROBLEM — S43.432A SUPERIOR GLENOID LABRUM LESION OF LEFT SHOULDER: Status: RESOLVED | Noted: 2017-08-15 | Resolved: 2017-11-21

## 2017-11-21 PROBLEM — M75.22 BICEPS TENDINITIS OF LEFT UPPER EXTREMITY: Status: RESOLVED | Noted: 2017-08-15 | Resolved: 2017-11-21

## 2017-11-21 NOTE — PROGRESS NOTES
Subjective:          Chief Complaint: Frank Becerra is a 32 y.o. male who had concerns including Post-op Evaluation of the Left Shoulder.    Mr. Becerra is here for f/u after his shoulder surgery. He is attending therapy.  Pain: 2/10    DATE OF PROCEDURE: 10/19/2017      PREOPERATIVE DIAGNOSES:   1. Left shoulder SLAP tear      POSTOPERATIVE DIAGNOSES:   1. Left shoulder SLAP tear  2. Rotator cuff fraying     PROCEDURES:   1. Left shoulder arthroscopic SLAP repair  2. Left shoulder arthroscopic rotator cuff debridement     SURGEON: Ranjit Rodriguez M.D.         Past Medical History:   Diagnosis Date    a Family H/O Cerebral Aneurysm     b Hypertension     c Hyperlipidemia With Mild Hypertriglyceridemia     d Family H/O DM     d Hyperglycemia     General anesthetics causing adverse effect in therapeutic use     pt. states he has come out of anesthesia prematurely    l Left Shoulder Arthropathy     m Headaches 12/2014     m Left Occipital Neuralgia Due To Left Shoulder Arthropathy     Dr. Matta ([facility])    q Left Leg Skin Lesions Due To Tick Bites 10/4/16     q Tick Bites 10/4/16     10/4/16 RXd Doxy X 10 Days And Ordered Lyme Titers    Wellness Visit 8/16/2017        Past Surgical History:   Procedure Laterality Date    HAND SURGERY Left     WISDOM TOOTH EXTRACTION  2007       Family History   Problem Relation Age of Onset    Heart disease Mother     Stroke Mother          Current Outpatient Prescriptions:     atorvastatin (LIPITOR) 40 MG tablet, Take 1 tablet (40 mg total) by mouth every evening., Disp: 90 tablet, Rfl: 3    docusate sodium (COLACE) 100 MG capsule, Take 1 capsule (100 mg total) by mouth 2 (two) times daily., Disp: 60 capsule, Rfl: 0    flaxseed oil 1,000 mg Cap, Take 2,000 mg by mouth once daily., Disp: , Rfl:     gabapentin (NEURONTIN) 600 MG tablet, Take 1 tablet (600 mg total) by mouth 3 (three) times daily., Disp: 90 tablet, Rfl: 11     hydrocodone-acetaminophen 5-325mg (NORCO) 5-325 mg per tablet, Take 1 tablet by mouth every 4 (four) hours as needed for Pain., Disp: 12 tablet, Rfl: 0    methocarbamol (ROBAXIN) 750 MG Tab, Take 1 tablet (750 mg total) by mouth 3 (three) times daily as needed., Disp: 60 tablet, Rfl: 3    oxycodone-acetaminophen (PERCOCET) 5-325 mg per tablet, Take 1 tablet by mouth every 8 (eight) hours as needed for Pain., Disp: 90 tablet, Rfl: 0    promethazine (PHENERGAN) 25 MG tablet, Take 1 tablet (25 mg total) by mouth every 6 (six) hours as needed for Nausea., Disp: 20 tablet, Rfl: 0    tramadol (ULTRAM) 50 mg tablet, Take 1 tablet (50 mg total) by mouth every 6 (six) hours as needed for Pain., Disp: 30 tablet, Rfl: 1    valsartan-hydrochlorothiazide (DIOVAN-HCT) 160-12.5 mg per tablet, Take 1 tablet by mouth every morning., Disp: 90 tablet, Rfl: 3    Review of patient's allergies indicates:  No Known Allergies    Vitals:    11/14/17 0858   BP: 127/83   Pulse: (!) 114         Review of Systems   Musculoskeletal: Positive for joint pain, muscle weakness and stiffness.   All other systems reviewed and are negative.                  Objective:        General: Frank is well-developed, well-nourished, appears stated age, in no acute distress, alert and oriented to time, place and person.     General    Vitals reviewed.  Constitutional: He is oriented to person, place, and time. He appears well-developed and well-nourished. No distress.   HENT:   Head: Normocephalic and atraumatic.   Nose: Nose normal.   Cardiovascular: Normal rate.    Pulmonary/Chest: Effort normal.   Neurological: He is alert and oriented to person, place, and time.   Psychiatric: He has a normal mood and affect. His behavior is normal. Judgment and thought content normal.             Left Shoulder Exam     Inspection/Observation   Swelling: absent  Bruising: absent  Scars: present  Deformity: absent  Scapular Winging: absent  Scapular Dyskinesia:  negative  Atrophy: absent    Tenderness   Left shoulder tenderness location: portals.    Range of Motion   Active Abduction: 170   Forward Flexion: 180   Forward Elevation: 180  External Rotation 0 degrees:  30 abnormal   External Rotation 90 degrees: 70 abnormal  Internal Rotation 0 degrees: Lumbar   Internal Rotation 90 degrees:  20 abnormal     Tests & Signs   Rotator Cuff Painful Arc/Range: mild  Active Compression test ([place]'s Sign): negative  [place]'s Test: negative  Speed's Test: negative    Other   Sensation: normal     Comments:  Incisions healed      Muscle Strength   Left Upper Extremity  Shoulder Abduction: 5/5   Shoulder Internal Rotation: 5/5   Shoulder External Rotation: 5/5   Supraspinatus: 5/5/5   Subscapularis: 5/5/5   Biceps: 5/5/5     Vascular Exam       Left Pulses      Radial:                    2+      Capillary Refill  Left Hand: normal capillary refill      Current and previous radiographic studies and results were reviewed with the patient:   No obvious changes from previous films        Assessment:       Encounter Diagnoses   Name Primary?    Post-op pain Yes    S/P arthroscopy of shoulder           Plan:         Discontinue sling  Continue with PT  Continue with no work as patient is not able to return with duty restrictions   Work note completed  F/U scheduled

## 2017-11-30 NOTE — PROGRESS NOTES
[facility] New Patient Evaluation    Referred by: AUGUSTINA Cruz  Reason for referral:   M54.41 (ICD-10-CM) - Acute right-sided low back pain with right-sided sciatica   M62.830 (ICD-10-CM) - Muscle spasm of back   M54.16 (ICD-10-CM) - Lumbar radiculopathy     CC:   Chief Complaint   Patient presents with    Low-back Pain     right sided     Last 3 PDI Scores 12/1/2017   Pain Disability Index (PDI) 17     HPI: Frank Becerra is a pleasant 32 y.o. male who presented to my clinic complaining of right sided lower back pain as characterized below.    Location: right sided lower back  Severity: Currently: 0/10   Typical Range: 2/10     Exacerbation: 10/10 - his pain is greatly improved due to PT and chiro care  Onset: early October 2017 the morning after he lifted heavy objects  Quality: Aching, Throbbing and Grabbing  Radiation: right anteriolateral thigh  Axial/Extremity Percentage of Pain: 80/20  Exacerbating Factors: nothing in particular  Mitigating Factors: physical therapy, chiro care  Assoc: denies night fever/night sweats, urinary incontinence, bowel incontinence, significant weight loss, significant motor weakness and loss of sensations    Previous Therapies:  PT: in PT currently seeing chiropractor  HEP:   TENS:  Injections: IM steroid injections  Surgery: None  Medications:    - NSAIDS: Ibuprofen, Aleve   - MSK Relaxants: Robaxin - helped   - TCAs:    - SNRIs:    - Topicals:     Current Pain Medications:  1. Gabapentin 600mg 1tab TID      Full Medication List:    Current Outpatient Prescriptions:     atorvastatin (LIPITOR) 40 MG tablet, Take 1 tablet (40 mg total) by mouth every evening., Disp: 90 tablet, Rfl: 3    docusate sodium (COLACE) 100 MG capsule, Take 1 capsule (100 mg total) by mouth 2 (two) times daily., Disp: 60 capsule, Rfl: 0    flaxseed oil 1,000 mg Cap, Take 2,000 mg by mouth once daily., Disp: , Rfl:     gabapentin (NEURONTIN) 600 MG tablet, Take 1 tablet (600 mg  total) by mouth 3 (three) times daily., Disp: 90 tablet, Rfl: 11    methocarbamol (ROBAXIN) 750 MG Tab, Take 1 tablet (750 mg total) by mouth 3 (three) times daily as needed., Disp: 60 tablet, Rfl: 3    promethazine (PHENERGAN) 25 MG tablet, Take 1 tablet (25 mg total) by mouth every 6 (six) hours as needed for Nausea., Disp: 20 tablet, Rfl: 0    valsartan-hydrochlorothiazide (DIOVAN-HCT) 160-12.5 mg per tablet, Take 1 tablet by mouth every morning., Disp: 90 tablet, Rfl: 3    gabapentin (NEURONTIN) 300 MG capsule, , Disp: , Rfl: 3    hydrocodone-acetaminophen 5-325mg (NORCO) 5-325 mg per tablet, Take 1 tablet by mouth every 4 (four) hours as needed for Pain., Disp: 12 tablet, Rfl: 0    oxycodone-acetaminophen (PERCOCET) 5-325 mg per tablet, Take 1 tablet by mouth every 8 (eight) hours as needed for Pain., Disp: 90 tablet, Rfl: 0    tramadol (ULTRAM) 50 mg tablet, Take 1 tablet (50 mg total) by mouth every 6 (six) hours as needed for Pain., Disp: 30 tablet, Rfl: 1     Review of Systems:  Review of Systems   Constitutional: Negative for chills and fever.   HENT: Negative for nosebleeds.    Eyes: Negative for pain.   Respiratory: Negative for hemoptysis.    Cardiovascular: Negative for chest pain.   Gastrointestinal: Negative for nausea and vomiting.   Genitourinary: Negative for dysuria.   Musculoskeletal: Positive for back pain.   Skin: Negative for rash.   Neurological: Negative for focal weakness.   Endo/Heme/Allergies: Does not bruise/bleed easily.   Psychiatric/Behavioral: Negative for substance abuse.       Allergies:  Patient has no known allergies.     Medical History:  Past Medical History:   Diagnosis Date    a Family H/O Cerebral Aneurysm     b Hypertension     c Hyperlipidemia With Mild Hypertriglyceridemia     d Family H/O DM     d Hyperglycemia     General anesthetics causing adverse effect in therapeutic use     pt. states he has come out of anesthesia prematurely    l Left Shoulder  Arthropathy     m Headaches 12/2014     m Left Occipital Neuralgia Due To Left Shoulder Arthropathy     Dr. Matta ([facility])    q Left Leg Skin Lesions Due To Tick Bites 10/4/16     q Tick Bites 10/4/16     10/4/16 RXd Doxy X 10 Days And Ordered Lyme Titers    Wellness Visit 8/16/2017         Surgical History:  Past Surgical History:   Procedure Laterality Date    HAND SURGERY Left     WISDOM TOOTH EXTRACTION  2007        Social History:  Social History     Social History    Marital status:      Spouse name: N/A    Number of children: N/A    Years of education: N/A     Occupational History    Not on file.     Social History Main Topics    Smoking status: Never Smoker    Smokeless tobacco: Never Used    Alcohol use No      Comment: ocassional     Drug use: No    Sexual activity: Not on file     Other Topics Concern    Not on file     Social History Narrative    No narrative on file       Physical Exam:  Vitals:    12/01/17 1041   Weight: 83 kg (183 lb)   PainSc: 0-No pain     General    Nursing note and vitals reviewed.  Constitutional: He is oriented to person, place, and time. He appears well-developed and well-nourished. No distress.   HENT:   Head: Normocephalic and atraumatic.   Nose: Nose normal.   Eyes: Conjunctivae and EOM are normal. Pupils are equal, round, and reactive to light. Right eye exhibits no discharge. Left eye exhibits no discharge. No scleral icterus.   Neck: No JVD present.   Cardiovascular: Intact distal pulses.    Pulmonary/Chest: Effort normal. No respiratory distress.   Abdominal: He exhibits no distension.   Neurological: He is alert and oriented to person, place, and time. Coordination normal.   Psychiatric: He has a normal mood and affect. His behavior is normal. Judgment and thought content normal.     General Musculoskeletal Exam   Gait: normal     Back (L-Spine & T-Spine) / Neck (C-Spine) Exam     Back (L-Spine & T-Spine) Range of Motion   Extension:  normal   Flexion: normal   Lateral Bend Right: normal   Lateral Bend Left: normal   Rotation Right: normal   Rotation Left: normal       Muscle Strength   Right Lower Extremity   Hip Flexion: 5/5   Quadriceps:  5/5   Hamstrin/5   Gastrocsoleus:  5/5/5  EHL:  5/5  Left Lower Extremity   Hip Flexion: 5/5   Quadriceps:  5/5   Hamstrin/5   Gastrocsoleus:  5/5/5  EHL:  5/5      Imaging:  MRI Lumbar Spine W WO Contrast 10/18/17 Narrative   The lumbar  vertebral body heights are preserved. The static anterior-posterior cervical vertebral body alignment is within normal limits. No suspicious bone marrow edema seen. No suspicious postcontrast enhancement is visualized. The conus medullaris terminate at approximately the T12-L1 level.  L1-L2 demonstrates no significant disc protrusion, central spinal canal stenosis, or neural foraminal stenosis.  L2-L3 demonstrates no significant disc protrusion, central spinal canal stenosis, or neural foraminal stenosis.  L3-L4 demonstrates mild disc desiccation and minimal broad-based disc bulge slightly asymmetric to right without significant overall central spinal canal stenosis. Mild bilateral anterior-inferior neural foraminal narrowing is seen.  L4-L5 demonstrates mild disc height loss, disc desiccation, mild broad-based disc bulge small left foraminal/extra foraminal annular fissure, and mild bilateral facet arthrosis, left greater than right. No significant overall central spinal canal stenosis is seen. Mild to moderate bilateral neural foraminal narrowing is seen.  L5-S1 demonstrates small broad-based central to slightly right paracentral disc protrusion and mild, left greater than right, facet arthrosis. No significant central spinal canal or neural foraminal stenosis is seen.   Impression   1. Minimal broad-based disc bulge at L3-L4 is seen with mild anterior-inferior bilateral neural foraminal narrowing.  2. Mild broad-based disc bulge at L4-L5 along with mild  bilateral facet arthrosis is seen with mild to moderate bilateral neural foraminal narrowing.  3. Small broad-based central to slightly right paracentral disc protrusion and facet arthrosis at L5-S1 is seen without significant central spinal canal or neural foraminal stenosis.     Labs:  BMP  No results found for: NA, K, CL, CO2, BUN, CREATININE, CALCIUM, ANIONGAP, ESTGFRAFRICA, EGFRNONAA  No results found for: ALT, AST, GGT, ALKPHOS, BILITOT    Diagnoses & Associated Orders:  Problem List Items Addressed This Visit     DDD (degenerative disc disease), lumbosacral    Neuroforaminal stenosis of lumbar spine    Lumbosacral radiculopathy at L4        31 yo M recovering from exacerbation of ddd with lumbar NFS and radiculopathy.    Recommendations & Interventions:   Procedures: None recommended at this time though we discussed TFESI as a treatment options should symptoms return.  Medications: No changes recommended at this time.  Cont gabapentin.  Imaging: No additional recommend at this time.  PT/OT: Currently enrolled.  HEP: I recommend that the patient start a home exercise regimen that includes daily, moderate cardiovascular exercise lasting at least 30 minutes.  This may include yoga, oumou chi, walking, swimming, aqua aerobics, or other exercises that maintain a heart rate of 50-70% of the calculated maximum heart rate.  I also recommend light, daily stretching focused on the neck, low back, gluteal, and thigh muscles.  These recommendations will help to improve function and reduce the likelihood of future exacerbations.  Follow Up: RTC PRN    Denise Roberts Jr, MD  Interventional Pain Medicine / Anesthesiology    Disclaimer: This note was partly generated using dictation software which may occasionally result in transcription errors.

## 2017-12-01 ENCOUNTER — OFFICE VISIT (OUTPATIENT)
Dept: PAIN MEDICINE | Facility: CLINIC | Age: 32
End: 2017-12-01
Payer: COMMERCIAL

## 2017-12-01 VITALS
BODY MASS INDEX: 27.02 KG/M2 | SYSTOLIC BLOOD PRESSURE: 132 MMHG | DIASTOLIC BLOOD PRESSURE: 82 MMHG | HEART RATE: 74 BPM | WEIGHT: 183 LBS

## 2017-12-01 DIAGNOSIS — M51.37 DDD (DEGENERATIVE DISC DISEASE), LUMBOSACRAL: ICD-10-CM

## 2017-12-01 DIAGNOSIS — M54.17 LUMBOSACRAL RADICULOPATHY AT L4: ICD-10-CM

## 2017-12-01 DIAGNOSIS — M48.061 NEUROFORAMINAL STENOSIS OF LUMBAR SPINE: ICD-10-CM

## 2017-12-01 PROBLEM — M51.379 DDD (DEGENERATIVE DISC DISEASE), LUMBOSACRAL: Status: ACTIVE | Noted: 2017-12-01

## 2017-12-01 PROCEDURE — 99999 PR PBB SHADOW E&M-EST. PATIENT-LVL II: CPT | Mod: PBBFAC,,, | Performed by: PAIN MEDICINE

## 2017-12-01 PROCEDURE — 99244 OFF/OP CNSLTJ NEW/EST MOD 40: CPT | Mod: S$GLB,,, | Performed by: PAIN MEDICINE

## 2017-12-01 RX ORDER — GABAPENTIN 300 MG/1
CAPSULE ORAL
Refills: 3 | COMMUNITY
Start: 2017-11-08 | End: 2019-01-10

## 2017-12-12 ENCOUNTER — OFFICE VISIT (OUTPATIENT)
Dept: ORTHOPEDICS | Facility: CLINIC | Age: 32
End: 2017-12-12
Payer: COMMERCIAL

## 2017-12-12 VITALS
DIASTOLIC BLOOD PRESSURE: 91 MMHG | BODY MASS INDEX: 27.11 KG/M2 | HEIGHT: 69 IN | HEART RATE: 90 BPM | SYSTOLIC BLOOD PRESSURE: 133 MMHG | WEIGHT: 183 LBS

## 2017-12-12 DIAGNOSIS — G89.18 POST-OP PAIN: Primary | ICD-10-CM

## 2017-12-12 DIAGNOSIS — Z98.890 S/P ARTHROSCOPY OF SHOULDER: ICD-10-CM

## 2017-12-12 PROCEDURE — 99999 PR PBB SHADOW E&M-EST. PATIENT-LVL III: CPT | Mod: PBBFAC,,, | Performed by: ORTHOPAEDIC SURGERY

## 2017-12-12 PROCEDURE — 99024 POSTOP FOLLOW-UP VISIT: CPT | Mod: S$GLB,,, | Performed by: ORTHOPAEDIC SURGERY

## 2017-12-12 NOTE — PROGRESS NOTES
Subjective:          Chief Complaint: Frank Becerra is a 32 y.o. male who had concerns including Post-op Evaluation and Pain of the Left Shoulder.    Mr. Becerra is here for f/u after his shoulder surgery. He is attending therapy.  Pain: 0/10 He states he is ready to return to work.     DATE OF PROCEDURE: 10/19/2017      PREOPERATIVE DIAGNOSES:   1. Left shoulder SLAP tear      POSTOPERATIVE DIAGNOSES:   1. Left shoulder SLAP tear  2. Rotator cuff fraying     PROCEDURES:   1. Left shoulder arthroscopic SLAP repair  2. Left shoulder arthroscopic rotator cuff debridement     SURGEON: Ranjit Rodirguez M.D.       Pain         Past Medical History:   Diagnosis Date    a Family H/O Cerebral Aneurysm     b Hypertension     c Hyperlipidemia With Mild Hypertriglyceridemia     d Family H/O DM     d Hyperglycemia     DDD (degenerative disc disease), lumbosacral 12/1/2017    General anesthetics causing adverse effect in therapeutic use     pt. states he has come out of anesthesia prematurely    l Left Shoulder Arthropathy     m Headaches 12/2014     m Left Occipital Neuralgia Due To Left Shoulder Arthropathy     Dr. Matta ([facility])    q Left Leg Skin Lesions Due To Tick Bites 10/4/16     q Tick Bites 10/4/16     10/4/16 RXd Doxy X 10 Days And Ordered Lyme Titers    Wellness Visit 8/16/2017        Past Surgical History:   Procedure Laterality Date    HAND SURGERY Left     WISDOM TOOTH EXTRACTION  2007       Family History   Problem Relation Age of Onset    Heart disease Mother     Stroke Mother          Current Outpatient Prescriptions:     atorvastatin (LIPITOR) 40 MG tablet, Take 1 tablet (40 mg total) by mouth every evening., Disp: 90 tablet, Rfl: 3    docusate sodium (COLACE) 100 MG capsule, Take 1 capsule (100 mg total) by mouth 2 (two) times daily., Disp: 60 capsule, Rfl: 0    flaxseed oil 1,000 mg Cap, Take 2,000 mg by mouth once daily., Disp: , Rfl:     gabapentin (NEURONTIN) 300 MG  capsule, , Disp: , Rfl: 3    gabapentin (NEURONTIN) 600 MG tablet, Take 1 tablet (600 mg total) by mouth 3 (three) times daily., Disp: 90 tablet, Rfl: 11    hydrocodone-acetaminophen 5-325mg (NORCO) 5-325 mg per tablet, Take 1 tablet by mouth every 4 (four) hours as needed for Pain., Disp: 12 tablet, Rfl: 0    methocarbamol (ROBAXIN) 750 MG Tab, Take 1 tablet (750 mg total) by mouth 3 (three) times daily as needed., Disp: 60 tablet, Rfl: 3    oxycodone-acetaminophen (PERCOCET) 5-325 mg per tablet, Take 1 tablet by mouth every 8 (eight) hours as needed for Pain., Disp: 90 tablet, Rfl: 0    promethazine (PHENERGAN) 25 MG tablet, Take 1 tablet (25 mg total) by mouth every 6 (six) hours as needed for Nausea., Disp: 20 tablet, Rfl: 0    tramadol (ULTRAM) 50 mg tablet, Take 1 tablet (50 mg total) by mouth every 6 (six) hours as needed for Pain., Disp: 30 tablet, Rfl: 1    valsartan-hydrochlorothiazide (DIOVAN-HCT) 160-12.5 mg per tablet, Take 1 tablet by mouth every morning., Disp: 90 tablet, Rfl: 3    Review of patient's allergies indicates:  No Known Allergies    Vitals:    12/12/17 0807   BP: (!) 133/91   Pulse: 90         Review of Systems   Musculoskeletal: Negative for joint pain, muscle weakness and stiffness.   All other systems reviewed and are negative.                  Objective:        General: Frank is well-developed, well-nourished, appears stated age, in no acute distress, alert and oriented to time, place and person.     General    Vitals reviewed.  Constitutional: He is oriented to person, place, and time. He appears well-developed and well-nourished. No distress.   HENT:   Head: Normocephalic and atraumatic.   Nose: Nose normal.   Cardiovascular: Normal rate.    Pulmonary/Chest: Effort normal.   Neurological: He is alert and oriented to person, place, and time.   Psychiatric: He has a normal mood and affect. His behavior is normal. Judgment and thought content normal.         Right Shoulder Exam    Right shoulder exam is normal.    Left Shoulder Exam     Inspection/Observation   Swelling: absent  Bruising: absent  Scars: present  Deformity: absent  Scapular Winging: absent  Scapular Dyskinesia: negative  Atrophy: absent    Tenderness   Left shoulder tenderness location: portals.    Range of Motion   Active Abduction: 170   Forward Flexion: 180   Forward Elevation: 180  External Rotation 0 degrees:  50 normal   External Rotation 90 degrees: 80 normal  Internal Rotation 0 degrees:  Mid thoracic normal   Internal Rotation 90 degrees:  40 normal     Tests & Signs   Active Compression test (Manatee's Sign): negative  Yergasons's Test: negative  Speed's Test: negative    Other   Sensation: normal     Comments:  Incisions healed      Muscle Strength   Left Upper Extremity  Shoulder Abduction: 5/5   Shoulder Internal Rotation: 5/5   Shoulder External Rotation: 5/5   Supraspinatus: 5/5/5   Subscapularis: 5/5/5   Biceps: 5/5/5     Vascular Exam       Left Pulses      Radial:                    2+      Capillary Refill  Left Hand: normal capillary refill      Current and previous radiographic studies and results were reviewed with the patient:   No obvious changes from previous films        Assessment:       Encounter Diagnoses   Name Primary?    Post-op pain Yes    S/P arthroscopy of shoulder           Plan:         Return to work without restrictions  F/U PRN

## 2018-02-07 ENCOUNTER — OFFICE VISIT (OUTPATIENT)
Dept: PAIN MEDICINE | Facility: CLINIC | Age: 33
End: 2018-02-07
Payer: COMMERCIAL

## 2018-02-07 VITALS
SYSTOLIC BLOOD PRESSURE: 164 MMHG | TEMPERATURE: 98 F | DIASTOLIC BLOOD PRESSURE: 85 MMHG | HEIGHT: 70 IN | HEART RATE: 91 BPM | BODY MASS INDEX: 27.28 KG/M2 | RESPIRATION RATE: 20 BRPM | WEIGHT: 190.56 LBS

## 2018-02-07 DIAGNOSIS — M48.061 NEUROFORAMINAL STENOSIS OF LUMBAR SPINE: ICD-10-CM

## 2018-02-07 DIAGNOSIS — M51.37 DDD (DEGENERATIVE DISC DISEASE), LUMBOSACRAL: ICD-10-CM

## 2018-02-07 DIAGNOSIS — M54.17 LUMBOSACRAL RADICULOPATHY AT L4: Primary | ICD-10-CM

## 2018-02-07 PROBLEM — S43.439A SLAP (SUPERIOR GLENOID LABRUM LESION): Status: ACTIVE | Noted: 2017-08-15

## 2018-02-07 PROCEDURE — 3008F BODY MASS INDEX DOCD: CPT | Mod: S$GLB,,, | Performed by: PAIN MEDICINE

## 2018-02-07 PROCEDURE — 99213 OFFICE O/P EST LOW 20 MIN: CPT | Mod: S$GLB,,, | Performed by: PAIN MEDICINE

## 2018-02-07 PROCEDURE — 99999 PR PBB SHADOW E&M-EST. PATIENT-LVL III: CPT | Mod: PBBFAC,,, | Performed by: PAIN MEDICINE

## 2018-02-07 NOTE — PROGRESS NOTES
[facility] Established Patient Evaluation    Referred by: AUGUSTINA Cruz  Reason for referral:   M54.41 (ICD-10-CM) - Acute right-sided low back pain with right-sided sciatica   M62.830 (ICD-10-CM) - Muscle spasm of back   M54.16 (ICD-10-CM) - Lumbar radiculopathy     CC:   Chief Complaint   Patient presents with    Low-back Pain    Leg Pain     right      Last 3 PDI Scores 2/7/2018 12/1/2017   Pain Disability Index (PDI) 0 17     Interval Update:  2/8/18 - He has no pain today.  He made the appointment a few weeks ago when he aggravated his low back bending over to  his child.  At the time, most of the pain was in the low but with a mild radiating component down the legs.      Background:  Frank Becerra is a pleasant 32 y.o. male who presented to my clinic complaining of right sided lower back pain as characterized below.    Location: right sided lower back  Severity: Currently: 0/10   Typical Range: 2/10     Exacerbation: 10/10 - his pain is greatly improved due to PT and chiro care  Onset: early October 2017 the morning after he lifted heavy objects  Quality: Aching, Throbbing and Grabbing  Radiation: right anteriolateral thigh  Axial/Extremity Percentage of Pain: 80/20  Exacerbating Factors: nothing in particular  Mitigating Factors: physical therapy, chiro care  Assoc: denies night fever/night sweats, urinary incontinence, bowel incontinence, significant weight loss, significant motor weakness and loss of sensations    Previous Therapies:  PT: in PT currently seeing chiropractor  HEP:   TENS:  Injections: IM steroid injections  Surgery: None  Medications:    - NSAIDS: Ibuprofen, Aleve   - MSK Relaxants: Robaxin - helped   - TCAs:    - SNRIs:    - Topicals:     Current Pain Medications:  1. Gabapentin 600mg 1tab TID      Full Medication List:    Current Outpatient Prescriptions:     atorvastatin (LIPITOR) 40 MG tablet, Take 1 tablet (40 mg total) by mouth every evening., Disp:  90 tablet, Rfl: 1    flaxseed oil 1,000 mg Cap, Take 2,000 mg by mouth once daily., Disp: , Rfl:     gabapentin (NEURONTIN) 600 MG tablet, Take 1 tablet (600 mg total) by mouth 3 (three) times daily., Disp: 90 tablet, Rfl: 11    valsartan-hydrochlorothiazide (DIOVAN-HCT) 160-12.5 mg per tablet, Take 1 tablet by mouth every morning., Disp: 90 tablet, Rfl: 1    docusate sodium (COLACE) 100 MG capsule, Take 1 capsule (100 mg total) by mouth 2 (two) times daily., Disp: 60 capsule, Rfl: 0    gabapentin (NEURONTIN) 300 MG capsule, , Disp: , Rfl: 3    hydrocodone-acetaminophen 5-325mg (NORCO) 5-325 mg per tablet, Take 1 tablet by mouth every 4 (four) hours as needed for Pain., Disp: 12 tablet, Rfl: 0    methocarbamol (ROBAXIN) 750 MG Tab, Take 1 tablet (750 mg total) by mouth 3 (three) times daily as needed., Disp: 60 tablet, Rfl: 3    oxycodone-acetaminophen (PERCOCET) 5-325 mg per tablet, Take 1 tablet by mouth every 8 (eight) hours as needed for Pain., Disp: 90 tablet, Rfl: 0    promethazine (PHENERGAN) 25 MG tablet, Take 1 tablet (25 mg total) by mouth every 6 (six) hours as needed for Nausea., Disp: 20 tablet, Rfl: 0    tramadol (ULTRAM) 50 mg tablet, Take 1 tablet (50 mg total) by mouth every 6 (six) hours as needed for Pain., Disp: 30 tablet, Rfl: 1     Review of Systems:  Review of Systems   Constitutional: Negative for chills and fever.   HENT: Negative for nosebleeds.    Eyes: Negative for pain.   Respiratory: Negative for hemoptysis.    Cardiovascular: Negative for chest pain.   Gastrointestinal: Negative for nausea and vomiting.   Genitourinary: Negative for dysuria.   Musculoskeletal: Positive for back pain.   Skin: Negative for rash.   Neurological: Negative for focal weakness.   Endo/Heme/Allergies: Does not bruise/bleed easily.   Psychiatric/Behavioral: Negative for substance abuse.       Allergies:  Patient has no known allergies.     Medical History:  Past Medical History:   Diagnosis Date    a  "Family H/O Cerebral Aneurysm     b Hypertension     c Hyperlipidemia With Mild Hypertriglyceridemia     d Family H/O DM     d Hyperglycemia     DDD (degenerative disc disease), lumbosacral 12/1/2017    General anesthetics causing adverse effect in therapeutic use     pt. states he has come out of anesthesia prematurely    l Left Shoulder Arthropathy     m Headaches 12/2014     m Left Occipital Neuralgia Due To Left Shoulder Arthropathy     Dr. Matta ([facility])    q Left Leg Skin Lesions Due To Tick Bites 10/4/16     q Tick Bites 10/4/16     10/4/16 RXd Doxy X 10 Days And Ordered Lyme Titers    Wellness Visit 8/16/2017         Surgical History:  Past Surgical History:   Procedure Laterality Date    HAND SURGERY Left     WISDOM TOOTH EXTRACTION  2007        Social History:  Social History     Social History    Marital status:      Spouse name: N/A    Number of children: N/A    Years of education: N/A     Occupational History    Not on file.     Social History Main Topics    Smoking status: Never Smoker    Smokeless tobacco: Never Used    Alcohol use No      Comment: ocassional     Drug use: No    Sexual activity: Not on file     Other Topics Concern    Not on file     Social History Narrative    No narrative on file       Physical Exam:  Vitals:    02/07/18 1528   BP: (!) 164/85   Pulse: 91   Resp: 20   Temp: 98 °F (36.7 °C)   TempSrc: Oral   Weight: 86.4 kg (190 lb 9.4 oz)   Height: 5' 10" (1.778 m)   PainSc: 0-No pain     General    Nursing note and vitals reviewed.  Constitutional: He is oriented to person, place, and time. He appears well-developed and well-nourished. No distress.   HENT:   Head: Normocephalic and atraumatic.   Nose: Nose normal.   Eyes: Conjunctivae and EOM are normal. Pupils are equal, round, and reactive to light. Right eye exhibits no discharge. Left eye exhibits no discharge. No scleral icterus.   Neck: No JVD present.   Cardiovascular: Intact distal " pulses.    Pulmonary/Chest: Effort normal. No respiratory distress.   Abdominal: He exhibits no distension.   Neurological: He is alert and oriented to person, place, and time. Coordination normal.   Psychiatric: He has a normal mood and affect. His behavior is normal. Judgment and thought content normal.     General Musculoskeletal Exam   Gait: normal     Back (L-Spine & T-Spine) / Neck (C-Spine) Exam     Back (L-Spine & T-Spine) Range of Motion   Extension: normal   Flexion: normal   Lateral Bend Right: normal   Lateral Bend Left: normal   Rotation Right: normal   Rotation Left: normal       Muscle Strength   Right Lower Extremity   Hip Flexion: 5/5   Quadriceps:  5/5   Hamstrin/5   Gastrocsoleus:  5/5/5  EHL:  5/5  Left Lower Extremity   Hip Flexion: 5/5   Quadriceps:  5/5   Hamstrin/5   Gastrocsoleus:  5/5/5  EHL:  5/5      Imaging:  MRI Lumbar Spine W WO Contrast 10/18/17 Narrative   The lumbar  vertebral body heights are preserved. The static anterior-posterior cervical vertebral body alignment is within normal limits. No suspicious bone marrow edema seen. No suspicious postcontrast enhancement is visualized. The conus medullaris terminate at approximately the T12-L1 level.  L1-L2 demonstrates no significant disc protrusion, central spinal canal stenosis, or neural foraminal stenosis.  L2-L3 demonstrates no significant disc protrusion, central spinal canal stenosis, or neural foraminal stenosis.  L3-L4 demonstrates mild disc desiccation and minimal broad-based disc bulge slightly asymmetric to right without significant overall central spinal canal stenosis. Mild bilateral anterior-inferior neural foraminal narrowing is seen.  L4-L5 demonstrates mild disc height loss, disc desiccation, mild broad-based disc bulge small left foraminal/extra foraminal annular fissure, and mild bilateral facet arthrosis, left greater than right. No significant overall central spinal canal stenosis is seen. Mild to  moderate bilateral neural foraminal narrowing is seen.  L5-S1 demonstrates small broad-based central to slightly right paracentral disc protrusion and mild, left greater than right, facet arthrosis. No significant central spinal canal or neural foraminal stenosis is seen.   Impression   1. Minimal broad-based disc bulge at L3-L4 is seen with mild anterior-inferior bilateral neural foraminal narrowing.  2. Mild broad-based disc bulge at L4-L5 along with mild bilateral facet arthrosis is seen with mild to moderate bilateral neural foraminal narrowing.  3. Small broad-based central to slightly right paracentral disc protrusion and facet arthrosis at L5-S1 is seen without significant central spinal canal or neural foraminal stenosis.     Labs:  BMP  No results found for: NA, K, CL, CO2, BUN, CREATININE, CALCIUM, ANIONGAP, ESTGFRAFRICA, EGFRNONAA  No results found for: ALT, AST, GGT, ALKPHOS, BILITOT    Diagnoses & Associated Orders:  Problem List Items Addressed This Visit     DDD (degenerative disc disease), lumbosacral    Neuroforaminal stenosis of lumbar spine    Lumbosacral radiculopathy at L4 - Primary        33 yo M with intermittent pain related to myofascial dysfunction and radiculopathy.    Recommendations & Interventions:   Procedures: None recommended at this time though we discussed TFESI as a treatment options should symptoms return.  Medications: No changes recommended at this time.  Cont gabapentin.  Imaging: No additional recommend at this time.  PT/OT: Currently enrolled.  HEP: once I again I reiterated the following: I recommend that the patient start a home exercise regimen that includes daily, moderate cardiovascular exercise lasting at least 30 minutes.  This may include yoga, oumou chi, walking, swimming, aqua aerobics, or other exercises that maintain a heart rate of 50-70% of the calculated maximum heart rate.  I also recommend light, daily stretching focused on the neck, low back, gluteal, and  thigh muscles.  These recommendations will help to improve function and reduce the likelihood of future exacerbations.  Follow Up: RTC PRN - he was told to send me a message during the next exacerbation so that I can act more quickly to help him with his pain    Denise Roberts Jr, MD  Interventional Pain Medicine / Anesthesiology    Disclaimer: This note was partly generated using dictation software which may occasionally result in transcription errors.

## 2020-06-12 PROBLEM — T14.8XXA NERVE LACERATION: Status: ACTIVE | Noted: 2020-06-12

## 2020-06-12 PROBLEM — S66.901A: Status: ACTIVE | Noted: 2020-06-12

## 2020-06-12 PROBLEM — S52.501B OPEN FRACTURE OF RIGHT DISTAL RADIUS: Status: ACTIVE | Noted: 2020-06-12

## 2020-06-12 PROBLEM — S61.501A: Status: ACTIVE | Noted: 2020-06-12

## 2021-05-05 ENCOUNTER — OFFICE VISIT (OUTPATIENT)
Dept: NEUROSURGERY | Facility: CLINIC | Age: 36
End: 2021-05-05
Payer: COMMERCIAL

## 2021-05-05 VITALS
HEIGHT: 70 IN | BODY MASS INDEX: 26.48 KG/M2 | DIASTOLIC BLOOD PRESSURE: 98 MMHG | WEIGHT: 184.94 LBS | SYSTOLIC BLOOD PRESSURE: 142 MMHG

## 2021-05-05 DIAGNOSIS — R90.89 ABNORMAL BRAIN MRI: ICD-10-CM

## 2021-05-05 PROCEDURE — 3008F BODY MASS INDEX DOCD: CPT | Mod: CPTII,S$GLB,, | Performed by: NEUROLOGICAL SURGERY

## 2021-05-05 PROCEDURE — 99204 PR OFFICE/OUTPT VISIT, NEW, LEVL IV, 45-59 MIN: ICD-10-PCS | Mod: S$GLB,,, | Performed by: NEUROLOGICAL SURGERY

## 2021-05-05 PROCEDURE — 99999 PR PBB SHADOW E&M-NEW PATIENT-LVL III: ICD-10-PCS | Mod: PBBFAC,,, | Performed by: NEUROLOGICAL SURGERY

## 2021-05-05 PROCEDURE — 99204 OFFICE O/P NEW MOD 45 MIN: CPT | Mod: S$GLB,,, | Performed by: NEUROLOGICAL SURGERY

## 2021-05-05 PROCEDURE — 99999 PR PBB SHADOW E&M-NEW PATIENT-LVL III: CPT | Mod: PBBFAC,,, | Performed by: NEUROLOGICAL SURGERY

## 2021-05-05 PROCEDURE — 1126F AMNT PAIN NOTED NONE PRSNT: CPT | Mod: S$GLB,,, | Performed by: NEUROLOGICAL SURGERY

## 2021-05-05 PROCEDURE — 1126F PR PAIN SEVERITY QUANTIFIED, NO PAIN PRESENT: ICD-10-PCS | Mod: S$GLB,,, | Performed by: NEUROLOGICAL SURGERY

## 2021-05-05 PROCEDURE — 3008F PR BODY MASS INDEX (BMI) DOCUMENTED: ICD-10-PCS | Mod: CPTII,S$GLB,, | Performed by: NEUROLOGICAL SURGERY

## 2024-04-14 NOTE — PROGRESS NOTES
Subjective:    Patient ID:  Frank Becerra is a 38 y.o. male who presents for evaluation of Tachycardia      Problem List Items Addressed This Visit          Cardiac/Vascular    Family H/O Cerebral Aneurysm - Primary    Overview     Has had evaluation in the past with MRA and Neurosurgery         Mixed hyperlipidemia    Primary hypertension       HPI    Here to establish care    The patient states that he has been having issues with short lived palpitations in the recent past. Has not happened for a few weeks.  No obvious triggers to episodes.   Minimal caffeine use.    No chest pain.  No shortness of breath.    Aneurysm history in family - Uncle passed from one  Taking atorvastatin or rosuvastatin - Tried atorvastatin, but did not have enough lipid control, switched to rosuvastatin and had muscle, so stopped it    Personal history of heart attack or stroke - None that he is aware of  Family history of heart disease - Grandfather with MI x 3, issues started in his 60s    Past Medical History:   Diagnosis Date    b Hypertension     c Hypercholesterolemia     4/22/22 RXd Crestor 40 Mg qHS; He Tolerated Lipitor 40 Mg Well But His Numbers Were Not Quite At Goal    d Hyperglycemia With Family H/O DM     4/22/22 And 2/23/20 RXd Lifestyle Changes    j Chronic Mildly Elevated ALT Levels     Am Monitoring    j Right Lower Quadrant Abdominal Pain     1/11/19 ABD/Pelic CT W/WO Contrast = Entirely Normal    l Chronic Mildly Elevated Sedimentation Rates ###    Am Monitoring    l DDD (degenerative disc disease), lumbosacral     l Left Shoulder Arthropathy     m Family H/O Cerebral Aneurysm ###    4/26/21 Referred To Dr. 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 Matta ([facility]); 7/26/17 Brain MRI W/O Contrast = Normal (See Report)    m Left Occipital  Neuralgia Due To Left Shoulder Arthropathy     Dr. Matta ([facility])    q General anesthetics causing adverse effect in therapeutic use     He Came Out Of Anesthesia Prematurely    q Left Leg Skin Lesions Due To Tick Bites 10/4/16     q Tick Bites 10/4/16     10/4/16 RXd Doxy X 10 Days And Ordered Lyme Titers    Wellness Visit 4/22/2022        Past Surgical History:   Procedure Laterality Date    HAND SURGERY Left     NERVE REPAIR Right 6/12/2020    Procedure: REPAIR, NERVE - Wrist;  Surgeon: PER Crump MD;  Location: [facility] OR;  Service: Orthopedics;  Laterality: Right;    OPEN REDUCTION AND INTERNAL FIXATION (ORIF) OF INJURY OF WRIST Right 6/12/2020    Procedure: ORIF, WRIST - Radial Styloid;  Surgeon: PER Crump MD;  Location: [facility] OR;  Service: Orthopedics;  Laterality: Right;    SHOULDER ARTHROSCOPY Left     TENOPLASTY OF HAND Right 6/12/2020    Procedure: REPAIR, TENDON, - Wrist;  Surgeon: PER Crump MD;  Location: [facility] OR;  Service: Orthopedics;  Laterality: Right;    WISDOM TOOTH EXTRACTION  2007       Family History   Problem Relation Name Age of Onset    Heart disease Mother      Stroke Mother         Social History     Socioeconomic History    Marital status:    Tobacco Use    Smoking status: Never    Smokeless tobacco: Never   Substance and Sexual Activity    Alcohol use: No     Comment: ocassional     Drug use: No    Sexual activity: Yes       Review of patient's allergies indicates:  No Known Allergies    Review of Systems   Constitutional: Negative for decreased appetite, fever and malaise/fatigue.   Eyes:  Negative for blurred vision.   Cardiovascular:  Negative for chest pain, dyspnea on exertion, irregular heartbeat and leg swelling.   Respiratory:  Negative for cough, hemoptysis, shortness of breath and wheezing.    Endocrine: Negative for cold intolerance and heat intolerance.   Hematologic/Lymphatic: Negative for bleeding problem.   Musculoskeletal:  Negative  "for muscle weakness and myalgias.   Gastrointestinal:  Negative for abdominal pain, constipation and diarrhea.   Genitourinary:  Negative for bladder incontinence.   Neurological:  Negative for dizziness and weakness.   Psychiatric/Behavioral:  Negative for depression.         Objective:     Vitals:    04/15/24 1435   BP: 136/86   BP Location: Left arm   Patient Position: Sitting   BP Method: Large (Automatic)   Pulse: 97   Weight: 84.1 kg (185 lb 6.5 oz)   Height: 5' 10" (1.778 m)       BP Readings from Last 5 Encounters:   04/15/24 136/86   04/22/22 112/78   05/05/21 (!) 142/98   04/01/21 128/79   06/12/20 (!) 141/78        Physical Exam  Constitutional:       Appearance: He is well-developed.   HENT:      Head: Normocephalic and atraumatic.   Neck:      Vascular: No JVD.   Cardiovascular:      Rate and Rhythm: Normal rate and regular rhythm.      Heart sounds: Normal heart sounds. No murmur heard.     No friction rub. No gallop.   Pulmonary:      Effort: Pulmonary effort is normal. No respiratory distress.      Breath sounds: Normal breath sounds. No wheezing or rales.   Abdominal:      General: Bowel sounds are normal.      Palpations: Abdomen is soft.      Tenderness: There is no abdominal tenderness. There is no guarding or rebound.   Musculoskeletal:      Cervical back: Normal range of motion and neck supple.   Skin:     General: Skin is warm and dry.   Neurological:      Mental Status: He is alert and oriented to person, place, and time.   Psychiatric:         Behavior: Behavior normal.             Current Outpatient Medications   Medication Instructions    flaxseed oiL 2,000 mg, Oral, Daily    losartan-hydrochlorothiazide 50-12.5 mg (HYZAAR) 50-12.5 mg per tablet 1 tablet, Oral, Every morning       Lipid Panel:   Lab Results   Component Value Date    CHOL 202 (H) 04/20/2022    HDL 39 (L) 04/20/2022    LDLCALC 137.0 04/20/2022    TRIG 130 04/20/2022    CHOLHDL 19.3 (L) 04/20/2022         The 10-year ASCVD " risk score (Nick LEE, et al., 2019) is: 2.1%    Values used to calculate the score:      Age: 38 years      Sex: Male      Is Non- : No      Diabetic: No      Tobacco smoker: No      Systolic Blood Pressure: 136 mmHg      Is BP treated: Yes      HDL Cholesterol: 39 mg/dL      Total Cholesterol: 202 mg/dL    Most Recent EKG Results  No results found for this or any previous visit.    Most Recent Echocardiogram Results  No results found for this or any previous visit.      Most Recent Nuclear Stress Test Results  No results found for this or any previous visit.      Most Recent Cardiac PET Stress Test Results  No results found for this or any previous visit.      Most Recent Cardiovascular Angiogram results  No results found for this or any previous visit.      Other Most Recent Cardiology Results  Results for orders placed during the hospital encounter of 06/12/20    CARDIAC MONITORING STRIPS        All pertinent data including labs, imaging, EKGs, and studies listed above were reviewed.  Patient's most recent EKG tracing was personally interpreted by this provider.    Assessment:       1. Family H/O Cerebral Aneurysm    2. Mixed hyperlipidemia    3. Primary hypertension         Plan for treatment of the above diagnoses:     Symptoms OK today  BP/Pulse OK today  Most recent EKG reviewed personally     If palpitations recur, will consider monitor at that time  Echocardiogram   Lipid panel   LPa, ApoB, HsCRP  Continue losartan-hydrochlorothiazide 50-12.5 mg PO Daily    Continue other cardiac medications  Mediterranean Diet/Cardiovascular Exercise Program    Patient queried and all questions were answered.    F/u in 1 year to reassess      Signed:    Phillip Sprague MD  4/15/2024 1:13 PM

## 2024-04-15 ENCOUNTER — OFFICE VISIT (OUTPATIENT)
Dept: CARDIOLOGY | Facility: CLINIC | Age: 39
End: 2024-04-15
Payer: COMMERCIAL

## 2024-04-15 VITALS
WEIGHT: 185.44 LBS | HEIGHT: 70 IN | HEART RATE: 97 BPM | BODY MASS INDEX: 26.55 KG/M2 | SYSTOLIC BLOOD PRESSURE: 136 MMHG | DIASTOLIC BLOOD PRESSURE: 86 MMHG

## 2024-04-15 DIAGNOSIS — Z01.30 ENCOUNTER FOR EXAMINATION OF BLOOD PRESSURE WITHOUT ABNORMAL FINDINGS: ICD-10-CM

## 2024-04-15 DIAGNOSIS — Z82.49 FAMILY HISTORY OF CEREBRAL ANEURYSM: Primary | ICD-10-CM

## 2024-04-15 DIAGNOSIS — I10 PRIMARY HYPERTENSION: ICD-10-CM

## 2024-04-15 DIAGNOSIS — E78.2 MIXED HYPERLIPIDEMIA: ICD-10-CM

## 2024-04-15 LAB
OHS QRS DURATION: 82 MS
OHS QTC CALCULATION: 416 MS

## 2024-04-15 PROCEDURE — 93005 ELECTROCARDIOGRAM TRACING: CPT | Mod: PO

## 2024-04-15 PROCEDURE — 99999 PR PBB SHADOW E&M-EST. PATIENT-LVL III: CPT | Mod: PBBFAC,,, | Performed by: INTERNAL MEDICINE

## 2024-04-15 PROCEDURE — 99204 OFFICE O/P NEW MOD 45 MIN: CPT | Mod: S$GLB,,, | Performed by: INTERNAL MEDICINE

## 2024-04-15 PROCEDURE — 93010 ELECTROCARDIOGRAM REPORT: CPT | Mod: S$GLB,,, | Performed by: INTERNAL MEDICINE

## 2024-05-06 ENCOUNTER — HOSPITAL ENCOUNTER (OUTPATIENT)
Dept: CARDIOLOGY | Facility: HOSPITAL | Age: 39
Discharge: HOME | End: 2024-05-06
Attending: INTERNAL MEDICINE
Payer: COMMERCIAL

## 2024-05-06 VITALS — WEIGHT: 185 LBS | HEIGHT: 70 IN | BODY MASS INDEX: 26.48 KG/M2

## 2024-05-06 DIAGNOSIS — Z01.30 ENCOUNTER FOR EXAMINATION OF BLOOD PRESSURE WITHOUT ABNORMAL FINDINGS: ICD-10-CM

## 2024-05-06 LAB
ASCENDING AORTA: 2.73 CM
AV INDEX (PROSTH): 0.76
AV MEAN GRADIENT: 3 MMHG
AV PEAK GRADIENT: 4 MMHG
AV VALVE AREA BY VELOCITY RATIO: 3.12 CM²
AV VALVE AREA: 2.89 CM²
AV VELOCITY RATIO: 0.82
BSA FOR ECHO PROCEDURE: 2.04 M2
CV ECHO LV RWT: 0.52 CM
DOP CALC AO PEAK VEL: 1.06 M/S
DOP CALC AO VTI: 20.1 CM
DOP CALC LVOT AREA: 3.8 CM2
DOP CALC LVOT DIAMETER: 2.2 CM
DOP CALC LVOT PEAK VEL: 0.87 M/S
DOP CALC LVOT STROKE VOLUME: 58.13 CM3
DOP CALCLVOT PEAK VEL VTI: 15.3 CM
E WAVE DECELERATION TIME: 182.87 MSEC
E/A RATIO: 1.29
E/E' RATIO: 5.83 M/S
ECHO LV POSTERIOR WALL: 1.02 CM (ref 0.6–1.1)
EJECTION FRACTION: 60 %
FRACTIONAL SHORTENING: 31 % (ref 28–44)
INTERVENTRICULAR SEPTUM: 0.89 CM (ref 0.6–1.1)
IVRT: 121.79 MSEC
LEFT ATRIUM SIZE: 3.34 CM
LEFT ATRIUM VOLUME INDEX MOD: 22.8 ML/M2
LEFT ATRIUM VOLUME MOD: 46.09 CM3
LEFT INTERNAL DIMENSION IN SYSTOLE: 2.71 CM (ref 2.1–4)
LEFT VENTRICLE DIASTOLIC VOLUME INDEX: 33.22 ML/M2
LEFT VENTRICLE DIASTOLIC VOLUME: 67.1 ML
LEFT VENTRICLE MASS INDEX: 57 G/M2
LEFT VENTRICLE SYSTOLIC VOLUME INDEX: 13.5 ML/M2
LEFT VENTRICLE SYSTOLIC VOLUME: 27.29 ML
LEFT VENTRICULAR INTERNAL DIMENSION IN DIASTOLE: 3.93 CM (ref 3.5–6)
LEFT VENTRICULAR MASS: 115.81 G
LV LATERAL E/E' RATIO: 4.47 M/S
LV SEPTAL E/E' RATIO: 8.38 M/S
LVOT MG: 1.62 MMHG
LVOT MV: 0.6 CM/S
MV PEAK A VEL: 0.52 M/S
MV PEAK E VEL: 0.67 M/S
MV STENOSIS PRESSURE HALF TIME: 53.03 MS
MV VALVE AREA P 1/2 METHOD: 4.15 CM2
OHS CV RV/LV RATIO: 0.84 CM
PISA TR MAX VEL: 2.12 M/S
PULM VEIN S/D RATIO: 0.86
PV PEAK D VEL: 0.44 M/S
PV PEAK S VEL: 0.38 M/S
RA PRESSURE ESTIMATED: 3 MMHG
RA VOL SYS: 28.32 ML
RIGHT ATRIAL AREA: 12.5 CM2
RIGHT VENTRICULAR END-DIASTOLIC DIMENSION: 3.3 CM
RIGHT VENTRICULAR LENGTH IN DIASTOLE (APICAL 4-CHAMBER VIEW): 8.18 CM
RV MID DIAMA: 2.69 CM
RV TB RVSP: 5 MMHG
RV TISSUE DOPPLER FREE WALL SYSTOLIC VELOCITY 1 (APICAL 4 CHAMBER VIEW): 15.33 CM/S
SINUS: 3.01 CM
STJ: 2.63 CM
TDI LATERAL: 0.15 M/S
TDI SEPTAL: 0.08 M/S
TDI: 0.12 M/S
TR MAX PG: 18 MMHG
TRICUSPID ANNULAR PLANE SYSTOLIC EXCURSION: 1.96 CM
TV REST PULMONARY ARTERY PRESSURE: 21 MMHG
Z-SCORE OF LEFT VENTRICULAR DIMENSION IN END DIASTOLE: -4.15
Z-SCORE OF LEFT VENTRICULAR DIMENSION IN END SYSTOLE: -2.35

## 2024-05-06 PROCEDURE — 93306 TTE W/DOPPLER COMPLETE: CPT | Mod: PO

## 2024-05-06 PROCEDURE — 93306 TTE W/DOPPLER COMPLETE: CPT | Mod: 26,,, | Performed by: INTERNAL MEDICINE

## 2024-05-07 ENCOUNTER — TELEPHONE (OUTPATIENT)
Dept: CARDIOLOGY | Facility: CLINIC | Age: 39
End: 2024-05-07
Payer: COMMERCIAL

## 2024-05-07 NOTE — TELEPHONE ENCOUNTER
----- Message from Phillip Sprague MD sent at 5/6/2024  3:11 PM CDT -----  High sensitivity CRP okay, but cholesterol with significant worsening.  Any opportunity for improvement in diet?

## 2024-05-07 NOTE — TELEPHONE ENCOUNTER
Spoke with pt in regards to changing diet. Pt verbalized understanding and is also going to establish with a primary care.

## 2024-12-18 ENCOUNTER — LAB VISIT (OUTPATIENT)
Dept: LAB | Facility: HOSPITAL | Age: 39
End: 2024-12-18
Attending: FAMILY MEDICINE
Payer: COMMERCIAL

## 2024-12-18 DIAGNOSIS — R73.9 HYPERGLYCEMIA: ICD-10-CM

## 2024-12-18 DIAGNOSIS — Z00.00 ROUTINE GENERAL MEDICAL EXAMINATION AT A HEALTH CARE FACILITY: Primary | ICD-10-CM

## 2024-12-18 DIAGNOSIS — E34.9 ENDOCRINE DISEASE: ICD-10-CM

## 2024-12-18 DIAGNOSIS — E78.5 HYPERLIPEMIA: ICD-10-CM

## 2024-12-18 DIAGNOSIS — Z79.899 ENCOUNTER FOR LONG-TERM (CURRENT) USE OF MEDICATIONS: ICD-10-CM

## 2024-12-18 LAB
25(OH)D3+25(OH)D2 SERPL-MCNC: 31 NG/ML (ref 30–96)
ALBUMIN SERPL BCP-MCNC: 4.2 G/DL (ref 3.5–5.2)
ALBUMIN/CREAT UR: 3.1 UG/MG (ref 0–30)
ALP SERPL-CCNC: 51 U/L (ref 40–150)
ALT SERPL W/O P-5'-P-CCNC: 44 U/L (ref 10–44)
ANION GAP SERPL CALC-SCNC: 13 MMOL/L (ref 8–16)
AST SERPL-CCNC: 34 U/L (ref 10–40)
BASOPHILS # BLD AUTO: 0.07 K/UL (ref 0–0.2)
BASOPHILS NFR BLD: 0.8 % (ref 0–1.9)
BILIRUB SERPL-MCNC: 0.5 MG/DL (ref 0.1–1)
BILIRUB UR QL STRIP: NEGATIVE
BUN SERPL-MCNC: 18 MG/DL (ref 6–20)
CALCIUM SERPL-MCNC: 9.9 MG/DL (ref 8.7–10.5)
CHLORIDE SERPL-SCNC: 101 MMOL/L (ref 95–110)
CHOLEST SERPL-MCNC: 383 MG/DL (ref 120–199)
CHOLEST/HDLC SERPL: 7.8 {RATIO} (ref 2–5)
CLARITY UR: CLEAR
CO2 SERPL-SCNC: 20 MMOL/L (ref 23–29)
COLOR UR: YELLOW
CREAT SERPL-MCNC: 1.3 MG/DL (ref 0.5–1.4)
CREAT UR-MCNC: 224 MG/DL (ref 23–375)
DIFFERENTIAL METHOD BLD: ABNORMAL
EOSINOPHIL # BLD AUTO: 0.3 K/UL (ref 0–0.5)
EOSINOPHIL NFR BLD: 3.2 % (ref 0–8)
ERYTHROCYTE [DISTWIDTH] IN BLOOD BY AUTOMATED COUNT: 12.6 % (ref 11.5–14.5)
EST. GFR  (NO RACE VARIABLE): >60 ML/MIN/1.73 M^2
ESTIMATED AVG GLUCOSE: 108 MG/DL (ref 68–131)
GLUCOSE SERPL-MCNC: 90 MG/DL (ref 70–110)
GLUCOSE UR QL STRIP: NEGATIVE
HBA1C MFR BLD: 5.4 % (ref 4–5.6)
HCT VFR BLD AUTO: 51.2 % (ref 40–54)
HDLC SERPL-MCNC: 49 MG/DL (ref 40–75)
HDLC SERPL: 12.8 % (ref 20–50)
HGB BLD-MCNC: 17.7 G/DL (ref 14–18)
HGB UR QL STRIP: NEGATIVE
IMM GRANULOCYTES # BLD AUTO: 0.06 K/UL (ref 0–0.04)
IMM GRANULOCYTES NFR BLD AUTO: 0.7 % (ref 0–0.5)
KETONES UR QL STRIP: NEGATIVE
LDLC SERPL CALC-MCNC: 286 MG/DL (ref 63–159)
LEUKOCYTE ESTERASE UR QL STRIP: NEGATIVE
LYMPHOCYTES # BLD AUTO: 1.9 K/UL (ref 1–4.8)
LYMPHOCYTES NFR BLD: 21.2 % (ref 18–48)
MCH RBC QN AUTO: 30.1 PG (ref 27–31)
MCHC RBC AUTO-ENTMCNC: 34.6 G/DL (ref 32–36)
MCV RBC AUTO: 87 FL (ref 82–98)
MICROALBUMIN UR DL<=1MG/L-MCNC: 7 UG/ML
MONOCYTES # BLD AUTO: 1 K/UL (ref 0.3–1)
MONOCYTES NFR BLD: 11.4 % (ref 4–15)
NEUTROPHILS # BLD AUTO: 5.7 K/UL (ref 1.8–7.7)
NEUTROPHILS NFR BLD: 62.7 % (ref 38–73)
NITRITE UR QL STRIP: NEGATIVE
NONHDLC SERPL-MCNC: 334 MG/DL
NRBC BLD-RTO: 0 /100 WBC
PH UR STRIP: 6 [PH] (ref 5–8)
PLATELET # BLD AUTO: 350 K/UL (ref 150–450)
PMV BLD AUTO: 9.1 FL (ref 9.2–12.9)
POTASSIUM SERPL-SCNC: 4.2 MMOL/L (ref 3.5–5.1)
PROSTATE SPECIFIC ANTIGEN, TOTAL: 1.5 NG/ML (ref 0–4)
PROT SERPL-MCNC: 7.9 G/DL (ref 6–8.4)
PROT UR QL STRIP: NEGATIVE
PSA FREE MFR SERPL: 32.67 %
PSA FREE SERPL-MCNC: 0.49 NG/ML (ref 0–1.5)
RBC # BLD AUTO: 5.88 M/UL (ref 4.6–6.2)
SODIUM SERPL-SCNC: 134 MMOL/L (ref 136–145)
SP GR UR STRIP: 1.02 (ref 1–1.03)
T3FREE SERPL-MCNC: 3.5 PG/ML (ref 2.3–4.2)
T4 FREE SERPL-MCNC: 0.86 NG/DL (ref 0.71–1.51)
TESTOST SERPL-MCNC: 553 NG/DL (ref 304–1227)
TRIGL SERPL-MCNC: 240 MG/DL (ref 30–150)
TSH SERPL DL<=0.005 MIU/L-ACNC: 1.09 UIU/ML (ref 0.4–4)
URN SPEC COLLECT METH UR: NORMAL
WBC # BLD AUTO: 9.05 K/UL (ref 3.9–12.7)

## 2024-12-18 PROCEDURE — 84402 ASSAY OF FREE TESTOSTERONE: CPT | Performed by: FAMILY MEDICINE

## 2024-12-18 PROCEDURE — 82043 UR ALBUMIN QUANTITATIVE: CPT | Performed by: FAMILY MEDICINE

## 2024-12-18 PROCEDURE — 84443 ASSAY THYROID STIM HORMONE: CPT | Performed by: FAMILY MEDICINE

## 2024-12-18 PROCEDURE — 36415 COLL VENOUS BLD VENIPUNCTURE: CPT | Mod: PO | Performed by: FAMILY MEDICINE

## 2024-12-18 PROCEDURE — 82306 VITAMIN D 25 HYDROXY: CPT | Performed by: FAMILY MEDICINE

## 2024-12-18 PROCEDURE — 84154 ASSAY OF PSA FREE: CPT | Performed by: FAMILY MEDICINE

## 2024-12-18 PROCEDURE — 80061 LIPID PANEL: CPT | Performed by: FAMILY MEDICINE

## 2024-12-18 PROCEDURE — 85025 COMPLETE CBC W/AUTO DIFF WBC: CPT | Performed by: FAMILY MEDICINE

## 2024-12-18 PROCEDURE — 84403 ASSAY OF TOTAL TESTOSTERONE: CPT | Performed by: FAMILY MEDICINE

## 2024-12-18 PROCEDURE — 80053 COMPREHEN METABOLIC PANEL: CPT | Performed by: FAMILY MEDICINE

## 2024-12-18 PROCEDURE — 84439 ASSAY OF FREE THYROXINE: CPT | Performed by: FAMILY MEDICINE

## 2024-12-18 PROCEDURE — 81003 URINALYSIS AUTO W/O SCOPE: CPT | Mod: PO | Performed by: FAMILY MEDICINE

## 2024-12-18 PROCEDURE — 83036 HEMOGLOBIN GLYCOSYLATED A1C: CPT | Performed by: FAMILY MEDICINE

## 2024-12-18 PROCEDURE — 84481 FREE ASSAY (FT-3): CPT | Performed by: FAMILY MEDICINE

## 2024-12-23 LAB — TESTOST FREE SERPL-MCNC: 14.2 PG/ML (ref 5.1–41.5)

## 2025-04-04 ENCOUNTER — TELEPHONE (OUTPATIENT)
Dept: CARDIOLOGY | Facility: CLINIC | Age: 40
End: 2025-04-04
Payer: COMMERCIAL

## 2025-04-04 NOTE — TELEPHONE ENCOUNTER
Spoke to pt in regards to r/s appt on 4/21 due to Dr. Fisher not being in clinic. Stated to pt that Dr. Fisher's first availability is not until 04/2026, pt v/u and accepted appt.

## 2025-07-02 ENCOUNTER — OFFICE VISIT (OUTPATIENT)
Dept: ORTHOPEDICS | Facility: CLINIC | Age: 40
End: 2025-07-02
Payer: COMMERCIAL

## 2025-07-02 ENCOUNTER — HOSPITAL ENCOUNTER (OUTPATIENT)
Dept: RADIOLOGY | Facility: HOSPITAL | Age: 40
Discharge: HOME | End: 2025-07-02
Attending: PHYSICIAN ASSISTANT
Payer: COMMERCIAL

## 2025-07-02 DIAGNOSIS — S69.91XA INJURY OF FINGER OF RIGHT HAND, INITIAL ENCOUNTER: Primary | ICD-10-CM

## 2025-07-02 DIAGNOSIS — S69.91XA INJURY OF FINGER OF RIGHT HAND, INITIAL ENCOUNTER: ICD-10-CM

## 2025-07-02 PROCEDURE — 99203 OFFICE O/P NEW LOW 30 MIN: CPT | Mod: S$GLB,,, | Performed by: PHYSICIAN ASSISTANT

## 2025-07-02 PROCEDURE — 1160F RVW MEDS BY RX/DR IN RCRD: CPT | Mod: CPTII,S$GLB,, | Performed by: PHYSICIAN ASSISTANT

## 2025-07-02 PROCEDURE — 99999 PR PBB SHADOW E&M-EST. PATIENT-LVL III: CPT | Mod: PBBFAC,,, | Performed by: PHYSICIAN ASSISTANT

## 2025-07-02 PROCEDURE — 73140 X-RAY EXAM OF FINGER(S): CPT | Mod: TC,PO,RT

## 2025-07-02 PROCEDURE — 73140 X-RAY EXAM OF FINGER(S): CPT | Mod: 26,RT,, | Performed by: RADIOLOGY

## 2025-07-02 PROCEDURE — 1159F MED LIST DOCD IN RCRD: CPT | Mod: CPTII,S$GLB,, | Performed by: PHYSICIAN ASSISTANT

## 2025-07-02 RX ORDER — MELOXICAM 15 MG/1
15 TABLET ORAL DAILY
Qty: 14 TABLET | Refills: 0 | Status: SHIPPED | OUTPATIENT
Start: 2025-07-02

## 2025-07-02 NOTE — PROGRESS NOTES
7/2/2025    HPI:  Frank Becerra is a 39 y.o. male, who presents to clinic today for evaluation of his right little finger pain.  States pain has been present for the past few weeks.  States pain is worse with pressure or palpation.  Denies any acute injury she can recall.  Denies any paresthesias.  Denies any other complaints this time.    PMHX:  Past Medical History:   Diagnosis Date    b Hypertension     c Hypercholesterolemia     4/22/22 RXd Crestor 40 Mg qHS; He Tolerated Lipitor 40 Mg Well But His Numbers Were Not Quite At Goal    d Hyperglycemia With Family H/O DM     4/22/22 And 2/23/20 RXd Lifestyle Changes    j Chronic Mildly Elevated ALT Levels     Am Monitoring    j Right Lower Quadrant Abdominal Pain     1/11/19 ABD/Pelic CT W/WO Contrast = Entirely Normal    l Chronic Mildly Elevated Sedimentation Rates ###    Am Monitoring    l DDD (degenerative disc disease), lumbosacral     l Left Shoulder Arthropathy     m Family H/O Cerebral Aneurysm ###    4/26/21 Referred To Dr. 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 Matta ([facility]); 7/26/17 Brain MRI W/O Contrast = Normal (See Report)    m Left Occipital Neuralgia Due To Left Shoulder Arthropathy     Dr. Matta ([facility])    q General anesthetics causing adverse effect in therapeutic use     He Came Out Of Anesthesia Prematurely    q Left Leg Skin Lesions Due To Tick Bites 10/4/16     q Tick Bites 10/4/16     10/4/16 RXd Doxy X 10 Days And Ordered Lyme Titers    Wellness Visit 4/22/2022        PSHX:  Past Surgical History:   Procedure Laterality Date    HAND SURGERY Left     NERVE REPAIR Right 6/12/2020    Procedure: REPAIR, NERVE - Wrist;  Surgeon: PER Crump MD;  Location: [facility];  Service: Orthopedics;  Laterality: Right;    OPEN REDUCTION AND INTERNAL FIXATION  (ORIF) OF INJURY OF WRIST Right 6/12/2020    Procedure: ORIF, WRIST - Radial Styloid;  Surgeon: PER Crump MD;  Location: [facility] OR;  Service: Orthopedics;  Laterality: Right;    SHOULDER ARTHROSCOPY Left     TENOPLASTY OF HAND Right 6/12/2020    Procedure: REPAIR, TENDON, - Wrist;  Surgeon: PER Crump MD;  Location: [facility] OR;  Service: Orthopedics;  Laterality: Right;    WISDOM TOOTH EXTRACTION  2007       FMHX:  Family History   Problem Relation Name Age of Onset    Heart disease Mother      Stroke Mother         SOCHX:  Social History     Tobacco Use    Smoking status: Never    Smokeless tobacco: Never   Substance Use Topics    Alcohol use: No     Comment: ocassional        ALLERGIES:  Patient has no known allergies.    CURRENT MEDICATIONS:  Medications Ordered Prior to Encounter[1]    REVIEW OF SYSTEMS:  Review of Systems Complete; Negative, unless noted above.    GENERAL PHYSICAL EXAM:   There were no vitals taken for this visit.   GEN: well developed, well nourished, no acute distress   PULM: No wheezing, no respiratory distress   CV: RRR    ORTHO EXAM:   Examination of the right little finger reveals mild edema over the radial aspect of the DIP joint.  Presence of a mild radial angulation deformity of the DIP joint of the right little finger No erythema, ecchymosis, or skin breakdown.  Tenderness palpation of the radial aspect of the DIP joint.  Firm endpoint noted of the radial and ulnar collateral ligaments of the right little finger DIP joint.  Full intact range of motion of the right little finger.  Normal sensation of the right little finger.  Capillary refill less than 2 seconds.    RADIOLOGY:   X-rays of the right little finger were taken today in clinic.  X-rays read by myself.  Imaging showed no acute fracture or dislocation no subluxation.  No radiopaque foreign body or mass noted.  No osseous destructive/erosive processes noted.  Presence of a mild radial angulation deformity of the  right little finger DIP joint.  No other significant bony abnormalities noted.    ASSESSMENT:   Right little finger pain with questionable injury    PLAN:  1. I discussed with Frank Richardson Hernan that little finger injury/pain pathology and treatment options in detail during today's visit.  After treatment options were discussed, decided the best course of action this time is to transition to taniya taping of the right ring and right little fingers for activity.  It would begin a short course of oral anti-inflammatories via meloxicam for the next 2 weeks.  He verbally agreed with the treatment plan.      2.  His right little and right ring fingers were taniya-taped in clinic today     3.  He is prescribed meloxicam 15 mg to be taken once daily for the next 2 weeks.  He was instructed to discontinue medication adverse effects.  He is instructed to not take any other type of NSAIDs while taking this medication.  He verbalized understanding     4. I would like him follow up in clinic on a PRN basis.  He was instructed to contact the clinic for any problems or concerns in the interim.           [1]   Current Outpatient Medications on File Prior to Visit   Medication Sig Dispense Refill    flaxseed oil 1,000 mg Cap Take 2,000 mg by mouth once daily.      losartan-hydrochlorothiazide 50-12.5 mg (HYZAAR) 50-12.5 mg per tablet TAKE 1 TABLET BY MOUTH EVERY MORNING 30 tablet 0     No current facility-administered medications on file prior to visit.

## (undated) DEVICE — DRESSING XEROFORM FOIL PK 1X8

## (undated) DEVICE — Device

## (undated) DEVICE — CHLORAPREP W TINT 26ML APPL

## (undated) DEVICE — SOL IRR NACL .9% 3000ML

## (undated) DEVICE — ELECTRODE COOLPULSE 90 W/HAND

## (undated) DEVICE — DRAPE INCISE IOBAN 2 23X17IN

## (undated) DEVICE — PUMP COLD THERAPY

## (undated) DEVICE — ELECTRODE REM PLYHSV RETURN 9

## (undated) DEVICE — TUBE SET INFLOW/OUTFLOW

## (undated) DEVICE — NDL SPINAL 18GX3.5 SPINOCAN

## (undated) DEVICE — BLADE SURG CARBON STEEL SZ11

## (undated) DEVICE — CANNULA HEX FLEX 7 X 85

## (undated) DEVICE — GAUZE SPONGE 4X4 12PLY

## (undated) DEVICE — UNDERGLOVE BIOGEL PI SZ 6.5 LF

## (undated) DEVICE — DRAPE STERI-DRAPE 1000 17X11IN

## (undated) DEVICE — KIT SHOULDER POSITIONER SPIDER

## (undated) DEVICE — SLEEVE LATERAL TRACTION ARM

## (undated) DEVICE — SYR 50CC LL

## (undated) DEVICE — DRAPE PLASTIC U 60X72

## (undated) DEVICE — SUT ETHILON 3-0 PS2 18 BLK

## (undated) DEVICE — UNDERGLOVES BIOGEL PI SZ 7 LF

## (undated) DEVICE — SEE MEDLINE ITEM 152530

## (undated) DEVICE — SOL NACL 0.9% INJ PF/100 150

## (undated) DEVICE — SET DECANTER MEDICHOICE

## (undated) DEVICE — NDL HYPO A BEVEL 22X1 1/2

## (undated) DEVICE — SEE MEDLINE ITEM 146313

## (undated) DEVICE — SUT IDEAL SHUTTLE RIGHT

## (undated) DEVICE — SEE MEDLINE ITEM 157131

## (undated) DEVICE — PAD ABD 8X10 STERILE

## (undated) DEVICE — SYR 30CC LUER LOCK

## (undated) DEVICE — PILLOW FACE ADLT FOAM W/VELCRO

## (undated) DEVICE — DRAPE STERI U-SHAPED 47X51IN

## (undated) DEVICE — PAD SHOULDER CARE POLAR

## (undated) DEVICE — NDL 18GA X1 1/2 REG BEVEL